# Patient Record
Sex: FEMALE | Race: WHITE | Employment: FULL TIME | URBAN - METROPOLITAN AREA
[De-identification: names, ages, dates, MRNs, and addresses within clinical notes are randomized per-mention and may not be internally consistent; named-entity substitution may affect disease eponyms.]

---

## 2017-01-24 ENCOUNTER — OFFICE VISIT (OUTPATIENT)
Dept: INTERNAL MEDICINE CLINIC | Facility: CLINIC | Age: 32
End: 2017-01-24

## 2017-01-24 VITALS
HEART RATE: 107 BPM | SYSTOLIC BLOOD PRESSURE: 108 MMHG | HEIGHT: 70 IN | TEMPERATURE: 98 F | DIASTOLIC BLOOD PRESSURE: 72 MMHG | OXYGEN SATURATION: 98 % | BODY MASS INDEX: 26.05 KG/M2 | WEIGHT: 182 LBS

## 2017-01-24 DIAGNOSIS — J40 BRONCHITIS: Primary | ICD-10-CM

## 2017-01-24 PROCEDURE — 99213 OFFICE O/P EST LOW 20 MIN: CPT | Performed by: INTERNAL MEDICINE

## 2017-01-24 PROCEDURE — 99212 OFFICE O/P EST SF 10 MIN: CPT | Performed by: INTERNAL MEDICINE

## 2017-01-24 RX ORDER — INHALER, ASSIST DEVICES
SPACER (EA) MISCELLANEOUS
Qty: 1 EACH | Refills: 0 | Status: SHIPPED | OUTPATIENT
Start: 2017-01-24 | End: 2019-07-30

## 2017-01-24 RX ORDER — ALBUTEROL SULFATE 90 UG/1
2 AEROSOL, METERED RESPIRATORY (INHALATION) EVERY 6 HOURS PRN
Qty: 1 INHALER | Refills: 0 | Status: SHIPPED | OUTPATIENT
Start: 2017-01-24 | End: 2017-07-19 | Stop reason: ALTCHOICE

## 2017-01-24 RX ORDER — PROMETHAZINE HYDROCHLORIDE AND CODEINE PHOSPHATE 6.25; 1 MG/5ML; MG/5ML
2.5 SYRUP ORAL EVERY 6 HOURS PRN
Qty: 240 ML | Refills: 0 | Status: SHIPPED | OUTPATIENT
Start: 2017-01-24 | End: 2017-07-19 | Stop reason: ALTCHOICE

## 2017-01-24 NOTE — PROGRESS NOTES
Gail Abdi is a 36year old female. Patient presents with:  Cough: Patient complains of deep cough, chest congestion, shortness of breath, fatigue, felt like she had a low grade fever yesterday.  She reports she had similar symptoms last month that laste kg/m2  SpO2 98%  LMP 01/16/2017 (Approximate)  GENERAL: well developed, well nourished, in no apparent distress  HEENT: normal oropharynx, normal TM's  NECK: supple, no adenopathy  LUNGS: clear to auscultation, no crackles, +slight bilateral exp wheezing a

## 2017-02-03 ENCOUNTER — TELEPHONE (OUTPATIENT)
Dept: INTERNAL MEDICINE CLINIC | Facility: CLINIC | Age: 32
End: 2017-02-03

## 2017-02-03 NOTE — TELEPHONE ENCOUNTER
Patient knows Dr. Chester Arguelles is out till Tuesday. Patient has updated information about Vyvance medication. Patient now has new [employer] - new plan does not cover the Vyvance. Patient might need to be prescribed a different medication.   Patient u

## 2017-02-04 NOTE — TELEPHONE ENCOUNTER
We could try Adderall XR 20mg/day -- there is no definite equivalent dose between Adderall and Vyvanse, so we may need to titrate up the dose if needed.   I will order if okay with pt -- would just do one month at a time for now until we are sure of her fin

## 2017-02-07 RX ORDER — DEXTROAMPHETAMINE SACCHARATE, AMPHETAMINE ASPARTATE MONOHYDRATE, DEXTROAMPHETAMINE SULFATE AND AMPHETAMINE SULFATE 5; 5; 5; 5 MG/1; MG/1; MG/1; MG/1
20 CAPSULE, EXTENDED RELEASE ORAL DAILY
Qty: 30 CAPSULE | Refills: 0 | Status: SHIPPED | OUTPATIENT
Start: 2017-02-07 | End: 2017-03-09

## 2017-03-08 ENCOUNTER — TELEPHONE (OUTPATIENT)
Dept: INTERNAL MEDICINE CLINIC | Facility: CLINIC | Age: 32
End: 2017-03-08

## 2017-03-08 RX ORDER — DEXTROAMPHETAMINE SACCHARATE, AMPHETAMINE ASPARTATE MONOHYDRATE, DEXTROAMPHETAMINE SULFATE AND AMPHETAMINE SULFATE 5; 5; 5; 5 MG/1; MG/1; MG/1; MG/1
20 CAPSULE, EXTENDED RELEASE ORAL DAILY
Qty: 30 CAPSULE | Refills: 0 | Status: SHIPPED | OUTPATIENT
Start: 2017-05-07 | End: 2017-06-02

## 2017-03-08 RX ORDER — DEXTROAMPHETAMINE SACCHARATE, AMPHETAMINE ASPARTATE MONOHYDRATE, DEXTROAMPHETAMINE SULFATE AND AMPHETAMINE SULFATE 5; 5; 5; 5 MG/1; MG/1; MG/1; MG/1
20 CAPSULE, EXTENDED RELEASE ORAL DAILY
Qty: 30 CAPSULE | Refills: 0 | Status: SHIPPED | OUTPATIENT
Start: 2017-03-08 | End: 2017-04-07

## 2017-03-08 RX ORDER — DEXTROAMPHETAMINE SACCHARATE, AMPHETAMINE ASPARTATE MONOHYDRATE, DEXTROAMPHETAMINE SULFATE AND AMPHETAMINE SULFATE 5; 5; 5; 5 MG/1; MG/1; MG/1; MG/1
20 CAPSULE, EXTENDED RELEASE ORAL DAILY
Qty: 30 CAPSULE | Refills: 0 | Status: SHIPPED | OUTPATIENT
Start: 2017-04-07 | End: 2017-05-07

## 2017-03-08 NOTE — TELEPHONE ENCOUNTER
I left message on patients cell phone (per HIPPA) that Rx's ready for pick-at  [address] and asked that patient call back to schedule annual PE with  in June.

## 2017-03-08 NOTE — TELEPHONE ENCOUNTER
I wrote 90 day supply of Adderall. Ready to . Pt will be due for annual PE in June -- please ask her to schedule.

## 2017-05-08 ENCOUNTER — TELEPHONE (OUTPATIENT)
Dept: INTERNAL MEDICINE CLINIC | Facility: CLINIC | Age: 32
End: 2017-05-08

## 2017-05-08 RX ORDER — ESCITALOPRAM OXALATE 10 MG/1
10 TABLET ORAL
Qty: 90 TABLET | Refills: 3 | Status: SHIPPED | OUTPATIENT
Start: 2017-05-08 | End: 2018-06-01

## 2017-05-08 NOTE — TELEPHONE ENCOUNTER
Pt. Is requesting a refill on: Lexapro  Pt. Is out of meds she switched pharmacies  Ph. # [phone]    [facility] ph.  # [phone]     Routed to Rx   Dr. Ulysses Leblanc is off can on-call fill

## 2017-06-02 ENCOUNTER — TELEPHONE (OUTPATIENT)
Dept: INTERNAL MEDICINE CLINIC | Facility: CLINIC | Age: 32
End: 2017-06-02

## 2017-06-02 RX ORDER — DEXTROAMPHETAMINE SACCHARATE, AMPHETAMINE ASPARTATE MONOHYDRATE, DEXTROAMPHETAMINE SULFATE AND AMPHETAMINE SULFATE 5; 5; 5; 5 MG/1; MG/1; MG/1; MG/1
20 CAPSULE, EXTENDED RELEASE ORAL DAILY
Qty: 30 CAPSULE | Refills: 0 | Status: SHIPPED | OUTPATIENT
Start: 2017-06-02 | End: 2017-07-02

## 2017-06-02 NOTE — TELEPHONE ENCOUNTER
Pt. Is requesting a refill on: Adderall  Ph. # [phone]  Pt. Has two pills left   Pt.  Is aware Dr. Ortega Johnson is out & would like on-call to prescribe   Routed to Rx

## 2017-06-02 NOTE — TELEPHONE ENCOUNTER
Called patient and relayed DR. AVILEZ message - verbalized understanding. RX in envelope at  [address] for  .  Patients mother will  RX

## 2017-07-06 ENCOUNTER — TELEPHONE (OUTPATIENT)
Dept: INTERNAL MEDICINE CLINIC | Facility: CLINIC | Age: 32
End: 2017-07-06

## 2017-07-06 RX ORDER — DEXTROAMPHETAMINE SACCHARATE, AMPHETAMINE ASPARTATE MONOHYDRATE, DEXTROAMPHETAMINE SULFATE AND AMPHETAMINE SULFATE 5; 5; 5; 5 MG/1; MG/1; MG/1; MG/1
20 CAPSULE, EXTENDED RELEASE ORAL DAILY
Qty: 30 CAPSULE | Refills: 0 | Status: SHIPPED | OUTPATIENT
Start: 2017-07-06 | End: 2017-07-19 | Stop reason: DRUGHIGH

## 2017-07-06 RX ORDER — DEXTROAMPHETAMINE SACCHARATE, AMPHETAMINE ASPARTATE MONOHYDRATE, DEXTROAMPHETAMINE SULFATE AND AMPHETAMINE SULFATE 5; 5; 5; 5 MG/1; MG/1; MG/1; MG/1
20 CAPSULE, EXTENDED RELEASE ORAL DAILY
Qty: 30 CAPSULE | Refills: 0 | Status: SHIPPED | OUTPATIENT
Start: 2017-08-05 | End: 2017-07-19

## 2017-07-06 RX ORDER — DEXTROAMPHETAMINE SACCHARATE, AMPHETAMINE ASPARTATE MONOHYDRATE, DEXTROAMPHETAMINE SULFATE AND AMPHETAMINE SULFATE 5; 5; 5; 5 MG/1; MG/1; MG/1; MG/1
20 CAPSULE, EXTENDED RELEASE ORAL DAILY
Qty: 30 CAPSULE | Refills: 0 | Status: SHIPPED | OUTPATIENT
Start: 2017-09-04 | End: 2017-07-19

## 2017-07-06 NOTE — TELEPHONE ENCOUNTER
Pt requesting refill Aderall 20 mg, pt only has 2 pills left.   Please call when ready for  [phone]    Tasked to rx low

## 2017-07-06 NOTE — TELEPHONE ENCOUNTER
Spoke with patient and relayed Dr. Devika Moseley' message. Patient verbalized an understanding and is scheduled for a PE on 7/19. Rx left at [address]  in the blue file folder. Patient will p/u today.

## 2017-07-06 NOTE — TELEPHONE ENCOUNTER
Rx'es ready to  (90 day). Please remind pt that she is overdue for a physical.  Needs to schedule before we give any additional refills.

## 2017-07-19 ENCOUNTER — OFFICE VISIT (OUTPATIENT)
Dept: INTERNAL MEDICINE CLINIC | Facility: CLINIC | Age: 32
End: 2017-07-19

## 2017-07-19 VITALS
SYSTOLIC BLOOD PRESSURE: 122 MMHG | WEIGHT: 191 LBS | TEMPERATURE: 99 F | HEART RATE: 88 BPM | BODY MASS INDEX: 27.35 KG/M2 | OXYGEN SATURATION: 98 % | HEIGHT: 70 IN | DIASTOLIC BLOOD PRESSURE: 70 MMHG

## 2017-07-19 DIAGNOSIS — F98.8 ATTENTION DEFICIT DISORDER (ADD) WITHOUT HYPERACTIVITY: ICD-10-CM

## 2017-07-19 DIAGNOSIS — Z00.00 ROUTINE HEALTH MAINTENANCE: Primary | ICD-10-CM

## 2017-07-19 PROCEDURE — 99395 PREV VISIT EST AGE 18-39: CPT | Performed by: INTERNAL MEDICINE

## 2017-07-19 RX ORDER — DEXTROAMPHETAMINE SACCHARATE, AMPHETAMINE ASPARTATE MONOHYDRATE, DEXTROAMPHETAMINE SULFATE AND AMPHETAMINE SULFATE 6.25; 6.25; 6.25; 6.25 MG/1; MG/1; MG/1; MG/1
25 CAPSULE, EXTENDED RELEASE ORAL DAILY
Qty: 30 CAPSULE | Refills: 0 | Status: SHIPPED | OUTPATIENT
Start: 2017-07-19 | End: 2017-08-18

## 2017-07-19 RX ORDER — GARLIC EXTRACT 500 MG
1 CAPSULE ORAL DAILY
COMMUNITY

## 2017-07-19 NOTE — PROGRESS NOTES
Shahana Cole is a 29year old female. Patient presents with:  Physical: annual      HPI:   Shahana Cole is a 29year old female who presents for a complete physical exam.   Feels well. Asking about increasing her Adderall. Feels a lack of focus.   Does Padmaja Tobin Mother      Parkinson's   • Blood Disorder Sister    • Psoriasis Sister    • Diabetes Other    • Breast Cancer Other       Social History:   Smoking status: Never Smoker                                                              [place]

## 2017-07-20 ENCOUNTER — LAB REQUISITION (OUTPATIENT)
Dept: LAB | Facility: HOSPITAL | Age: 32
End: 2017-07-20
Payer: COMMERCIAL

## 2017-07-20 DIAGNOSIS — Z11.3 ENCOUNTER FOR SCREENING FOR INFECTIONS WITH PREDOMINANTLY SEXUAL MODE OF TRANSMISSION: ICD-10-CM

## 2017-07-20 PROCEDURE — 87591 N.GONORRHOEAE DNA AMP PROB: CPT | Performed by: OBSTETRICS & GYNECOLOGY

## 2017-07-20 PROCEDURE — 87491 CHLMYD TRACH DNA AMP PROBE: CPT | Performed by: OBSTETRICS & GYNECOLOGY

## 2017-07-21 LAB
C TRACH DNA SPEC QL NAA+PROBE: NEGATIVE
N GONORRHOEA DNA SPEC QL NAA+PROBE: NEGATIVE

## 2017-09-27 ENCOUNTER — TELEPHONE (OUTPATIENT)
Dept: INTERNAL MEDICINE CLINIC | Facility: CLINIC | Age: 32
End: 2017-09-27

## 2017-09-27 RX ORDER — DEXTROAMPHETAMINE SACCHARATE, AMPHETAMINE ASPARTATE MONOHYDRATE, DEXTROAMPHETAMINE SULFATE AND AMPHETAMINE SULFATE 6.25; 6.25; 6.25; 6.25 MG/1; MG/1; MG/1; MG/1
25 CAPSULE, EXTENDED RELEASE ORAL DAILY
Qty: 30 CAPSULE | Refills: 0 | Status: SHIPPED | OUTPATIENT
Start: 2017-10-27 | End: 2017-11-26

## 2017-09-27 RX ORDER — DEXTROAMPHETAMINE SACCHARATE, AMPHETAMINE ASPARTATE MONOHYDRATE, DEXTROAMPHETAMINE SULFATE AND AMPHETAMINE SULFATE 6.25; 6.25; 6.25; 6.25 MG/1; MG/1; MG/1; MG/1
25 CAPSULE, EXTENDED RELEASE ORAL DAILY
Qty: 30 CAPSULE | Refills: 0 | Status: SHIPPED | OUTPATIENT
Start: 2017-11-26 | End: 2017-12-26

## 2017-09-27 RX ORDER — DEXTROAMPHETAMINE SACCHARATE, AMPHETAMINE ASPARTATE MONOHYDRATE, DEXTROAMPHETAMINE SULFATE AND AMPHETAMINE SULFATE 6.25; 6.25; 6.25; 6.25 MG/1; MG/1; MG/1; MG/1
25 CAPSULE, EXTENDED RELEASE ORAL DAILY
Qty: 30 CAPSULE | Refills: 0 | Status: SHIPPED | OUTPATIENT
Start: 2017-09-27 | End: 2017-10-27

## 2017-09-27 NOTE — TELEPHONE ENCOUNTER
Called patient and relayed DR. DOMINGUEZ message that RX is ready for  at  [address]- left on VM per hipaa Nadya Awkward will put RX to )

## 2017-09-27 NOTE — TELEPHONE ENCOUNTER
To MD:  The above refill request is for a controlled substance. Please indicate yes or no to refill 30 days supply plus one refill. If more refills are appropriate, please indicate quantity  To DR. Ortega Johnson

## 2018-01-18 ENCOUNTER — OFFICE VISIT (OUTPATIENT)
Dept: INTERNAL MEDICINE CLINIC | Facility: CLINIC | Age: 33
End: 2018-01-18

## 2018-01-18 VITALS
TEMPERATURE: 98 F | WEIGHT: 196 LBS | DIASTOLIC BLOOD PRESSURE: 78 MMHG | SYSTOLIC BLOOD PRESSURE: 132 MMHG | HEART RATE: 110 BPM | BODY MASS INDEX: 28.06 KG/M2 | OXYGEN SATURATION: 98 % | HEIGHT: 70 IN | RESPIRATION RATE: 16 BRPM

## 2018-01-18 DIAGNOSIS — F98.8 ATTENTION DEFICIT DISORDER (ADD) WITHOUT HYPERACTIVITY: ICD-10-CM

## 2018-01-18 DIAGNOSIS — J06.9 UPPER RESPIRATORY TRACT INFECTION, UNSPECIFIED TYPE: Primary | ICD-10-CM

## 2018-01-18 PROCEDURE — 99212 OFFICE O/P EST SF 10 MIN: CPT | Performed by: INTERNAL MEDICINE

## 2018-01-18 PROCEDURE — 99214 OFFICE O/P EST MOD 30 MIN: CPT | Performed by: INTERNAL MEDICINE

## 2018-01-18 RX ORDER — AMOXICILLIN 875 MG/1
875 TABLET, COATED ORAL 2 TIMES DAILY
Qty: 20 TABLET | Refills: 0 | Status: SHIPPED | OUTPATIENT
Start: 2018-01-18 | End: 2018-06-02

## 2018-01-18 RX ORDER — DEXTROAMPHETAMINE SACCHARATE, AMPHETAMINE ASPARTATE MONOHYDRATE, DEXTROAMPHETAMINE SULFATE AND AMPHETAMINE SULFATE 6.25; 6.25; 6.25; 6.25 MG/1; MG/1; MG/1; MG/1
25 CAPSULE, EXTENDED RELEASE ORAL DAILY
Qty: 30 CAPSULE | Refills: 0 | Status: SHIPPED | OUTPATIENT
Start: 2018-01-18 | End: 2018-06-02

## 2018-01-18 RX ORDER — DEXTROAMPHETAMINE SACCHARATE, AMPHETAMINE ASPARTATE MONOHYDRATE, DEXTROAMPHETAMINE SULFATE AND AMPHETAMINE SULFATE 6.25; 6.25; 6.25; 6.25 MG/1; MG/1; MG/1; MG/1
25 CAPSULE, EXTENDED RELEASE ORAL EVERY MORNING
COMMUNITY
End: 2018-05-16

## 2018-01-18 RX ORDER — DEXTROAMPHETAMINE SACCHARATE, AMPHETAMINE ASPARTATE MONOHYDRATE, DEXTROAMPHETAMINE SULFATE AND AMPHETAMINE SULFATE 6.25; 6.25; 6.25; 6.25 MG/1; MG/1; MG/1; MG/1
25 CAPSULE, EXTENDED RELEASE ORAL DAILY
Qty: 30 CAPSULE | Refills: 0 | Status: SHIPPED | OUTPATIENT
Start: 2018-02-17 | End: 2018-06-02

## 2018-01-18 RX ORDER — DEXTROAMPHETAMINE SACCHARATE, AMPHETAMINE ASPARTATE MONOHYDRATE, DEXTROAMPHETAMINE SULFATE AND AMPHETAMINE SULFATE 6.25; 6.25; 6.25; 6.25 MG/1; MG/1; MG/1; MG/1
25 CAPSULE, EXTENDED RELEASE ORAL DAILY
Qty: 30 CAPSULE | Refills: 0 | Status: SHIPPED | OUTPATIENT
Start: 2018-03-19 | End: 2018-06-02

## 2018-01-18 RX ORDER — DEXTROAMPHETAMINE SACCHARATE, AMPHETAMINE ASPARTATE MONOHYDRATE, DEXTROAMPHETAMINE SULFATE AND AMPHETAMINE SULFATE 6.25; 6.25; 6.25; 6.25 MG/1; MG/1; MG/1; MG/1
25 CAPSULE, EXTENDED RELEASE ORAL DAILY
Qty: 30 CAPSULE | Refills: 0 | Status: CANCELLED | OUTPATIENT
Start: 2018-01-18 | End: 2018-02-17

## 2018-01-18 NOTE — PROGRESS NOTES
Melany Pearl is a 29year old female. Patient presents with:  Sinus Problem: Patient c/o sinus problems for past 2 days. Pt c/o bilateral eye/ear pain, stiff neck, sore throat, temp 99.3 yesterday, and post nasal drip. Pain 3/10 to eyes, ears, and throat. (81.6 kg)  06/14/16 : 183 lb (83 kg)    Body mass index is 28.12 kg/m².       EXAM:   /78 (BP Location: Right arm, Patient Position: Sitting, Cuff Size: adult)   Pulse 110   Temp 98.3 °F (36.8 °C) (Oral)   Resp 16   Ht 5' 10\" (1.778 m)   Wt 196 lb (8

## 2018-01-21 ENCOUNTER — TELEPHONE (OUTPATIENT)
Dept: INTERNAL MEDICINE CLINIC | Facility: CLINIC | Age: 33
End: 2018-01-21

## 2018-05-16 NOTE — TELEPHONE ENCOUNTER
Pt. Called for her written scripts for Adderal.  She usually gets 3 separate scripts for the next 3 months. Please call her at  [phone] when ready for . She only has 1 day left and is hoping to  the scripts tomorrow.

## 2018-05-16 NOTE — TELEPHONE ENCOUNTER
To MD:  The above refill request is for a controlled substance. Please indicate yes or no to refill 30 days supply plus one refill. If more refills are appropriate, please indicate quantity  To DR. Justin Ramirez

## 2018-05-18 RX ORDER — DEXTROAMPHETAMINE SACCHARATE, AMPHETAMINE ASPARTATE MONOHYDRATE, DEXTROAMPHETAMINE SULFATE AND AMPHETAMINE SULFATE 6.25; 6.25; 6.25; 6.25 MG/1; MG/1; MG/1; MG/1
25 CAPSULE, EXTENDED RELEASE ORAL EVERY MORNING
Qty: 30 CAPSULE | Refills: 0 | Status: SHIPPED | OUTPATIENT
Start: 2018-05-18 | End: 2018-07-18

## 2018-05-18 NOTE — TELEPHONE ENCOUNTER
Pt. Called following up on refill request for Adderal.  Per Carolyn Kenney request needs to go to the On call which is Dr. Josephine Donaldson. Today. Please keenan Ravinder Fernandes at [phone] when ready for .

## 2018-05-18 NOTE — TELEPHONE ENCOUNTER
Noted.  I reviewed the patient's chart. I reviewed the patient's medication with a 30 day supply. I have printed out a prescription for the patient which I have signed. I placed a prescription in an envelope on my nurse's desk with the patient's name.

## 2018-05-18 NOTE — TELEPHONE ENCOUNTER
Patient is requesting a 3 month refill (3 separate scripts)  for her Adderall. Dr. Capone Portal refilled a 30 day supply for today.      To Dr. Franklin Shah to please advise on remaining refill request.

## 2018-05-31 RX ORDER — DEXTROAMPHETAMINE SACCHARATE, AMPHETAMINE ASPARTATE MONOHYDRATE, DEXTROAMPHETAMINE SULFATE AND AMPHETAMINE SULFATE 6.25; 6.25; 6.25; 6.25 MG/1; MG/1; MG/1; MG/1
25 CAPSULE, EXTENDED RELEASE ORAL DAILY
Qty: 30 CAPSULE | Refills: 0 | Status: SHIPPED | OUTPATIENT
Start: 2018-05-31 | End: 2018-07-18

## 2018-05-31 RX ORDER — DEXTROAMPHETAMINE SACCHARATE, AMPHETAMINE ASPARTATE MONOHYDRATE, DEXTROAMPHETAMINE SULFATE AND AMPHETAMINE SULFATE 6.25; 6.25; 6.25; 6.25 MG/1; MG/1; MG/1; MG/1
25 CAPSULE, EXTENDED RELEASE ORAL DAILY
Qty: 30 CAPSULE | Refills: 0 | Status: SHIPPED | OUTPATIENT
Start: 2018-07-30 | End: 2018-07-18

## 2018-05-31 RX ORDER — DEXTROAMPHETAMINE SACCHARATE, AMPHETAMINE ASPARTATE MONOHYDRATE, DEXTROAMPHETAMINE SULFATE AND AMPHETAMINE SULFATE 6.25; 6.25; 6.25; 6.25 MG/1; MG/1; MG/1; MG/1
25 CAPSULE, EXTENDED RELEASE ORAL DAILY
Qty: 30 CAPSULE | Refills: 0 | Status: SHIPPED | OUTPATIENT
Start: 2018-06-30 | End: 2018-07-18

## 2018-06-02 RX ORDER — ESCITALOPRAM OXALATE 10 MG/1
TABLET ORAL
Qty: 90 TABLET | Refills: 0 | Status: SHIPPED | OUTPATIENT
Start: 2018-06-02 | End: 2018-09-12

## 2018-06-22 NOTE — TELEPHONE ENCOUNTER
To MD:  The above refill request is for a controlled substance. Please indicate yes or no to refill 30 days supply plus one refill. If more refills are appropriate, please indicate quantity  To DR. Otilio Zapata

## 2018-06-22 NOTE — TELEPHONE ENCOUNTER
Pt would like a refill for her Amphetamine-Dextroamphet ER (ADDERALL XR) 25 MG Oral Capsule. Pt only has a couple pills, states she doesn't take them over the weekend so she will be fine until next week. Best call back for patient is [phone].  Tasked t

## 2018-06-25 NOTE — TELEPHONE ENCOUNTER
Pt. Is calling back to check status of Rx pt. Is out of meds  Ph. # [phone]  Pt.  Is aware  Is off & would like on-call to address   Routed to Rx

## 2018-06-27 RX ORDER — DEXTROAMPHETAMINE SACCHARATE, AMPHETAMINE ASPARTATE MONOHYDRATE, DEXTROAMPHETAMINE SULFATE AND AMPHETAMINE SULFATE 6.25; 6.25; 6.25; 6.25 MG/1; MG/1; MG/1; MG/1
25 CAPSULE, EXTENDED RELEASE ORAL DAILY
Qty: 30 CAPSULE | Refills: 0 | Status: CANCELLED | OUTPATIENT
Start: 2018-06-27 | End: 2018-07-27

## 2018-06-27 NOTE — TELEPHONE ENCOUNTER
Pt. Is calling to check status of Rx pt.  Has been ut of meds for two days  Ph. # [phone]   Routed low to Rx

## 2018-07-18 ENCOUNTER — OFFICE VISIT (OUTPATIENT)
Dept: INTERNAL MEDICINE CLINIC | Facility: CLINIC | Age: 33
End: 2018-07-18
Payer: COMMERCIAL

## 2018-07-18 ENCOUNTER — LAB ENCOUNTER (OUTPATIENT)
Dept: LAB | Age: 33
End: 2018-07-18
Attending: INTERNAL MEDICINE
Payer: COMMERCIAL

## 2018-07-18 VITALS
HEIGHT: 70 IN | HEART RATE: 81 BPM | BODY MASS INDEX: 30.06 KG/M2 | TEMPERATURE: 98 F | DIASTOLIC BLOOD PRESSURE: 68 MMHG | WEIGHT: 210 LBS | SYSTOLIC BLOOD PRESSURE: 102 MMHG | OXYGEN SATURATION: 98 %

## 2018-07-18 DIAGNOSIS — R63.5 WEIGHT GAIN: ICD-10-CM

## 2018-07-18 DIAGNOSIS — Z00.00 ROUTINE HEALTH MAINTENANCE: ICD-10-CM

## 2018-07-18 DIAGNOSIS — R53.83 OTHER FATIGUE: ICD-10-CM

## 2018-07-18 DIAGNOSIS — R53.83 OTHER FATIGUE: Primary | ICD-10-CM

## 2018-07-18 LAB
ALBUMIN SERPL BCP-MCNC: 3.9 G/DL (ref 3.5–4.8)
ALBUMIN/GLOB SERPL: 1.3 {RATIO} (ref 1–2)
ALP SERPL-CCNC: 50 U/L (ref 32–100)
ALT SERPL-CCNC: 20 U/L (ref 14–54)
ANION GAP SERPL CALC-SCNC: 6 MMOL/L (ref 0–18)
AST SERPL-CCNC: 20 U/L (ref 15–41)
BASOPHILS # BLD: 0.1 K/UL (ref 0–0.2)
BASOPHILS NFR BLD: 1 %
BILIRUB SERPL-MCNC: 0.4 MG/DL (ref 0.3–1.2)
BUN SERPL-MCNC: 10 MG/DL (ref 8–20)
BUN/CREAT SERPL: 11.6 (ref 10–20)
CALCIUM SERPL-MCNC: 9.5 MG/DL (ref 8.5–10.5)
CHLORIDE SERPL-SCNC: 111 MMOL/L (ref 95–110)
CHOLEST SERPL-MCNC: 137 MG/DL (ref 110–200)
CO2 SERPL-SCNC: 24 MMOL/L (ref 22–32)
CREAT SERPL-MCNC: 0.86 MG/DL (ref 0.5–1.5)
EOSINOPHIL # BLD: 0.2 K/UL (ref 0–0.7)
EOSINOPHIL NFR BLD: 3 %
ERYTHROCYTE [DISTWIDTH] IN BLOOD BY AUTOMATED COUNT: 12.7 % (ref 11–15)
GLOBULIN PLAS-MCNC: 3.1 G/DL (ref 2.5–3.7)
GLUCOSE SERPL-MCNC: 89 MG/DL (ref 70–99)
HCT VFR BLD AUTO: 42.5 % (ref 35–48)
HDLC SERPL-MCNC: 58 MG/DL
HGB BLD-MCNC: 14.6 G/DL (ref 12–16)
LDLC SERPL CALC-MCNC: 59 MG/DL (ref 0–99)
LYMPHOCYTES # BLD: 2 K/UL (ref 1–4)
LYMPHOCYTES NFR BLD: 29 %
MCH RBC QN AUTO: 30.7 PG (ref 27–32)
MCHC RBC AUTO-ENTMCNC: 34.4 G/DL (ref 32–37)
MCV RBC AUTO: 89.1 FL (ref 80–100)
MONOCYTES # BLD: 0.6 K/UL (ref 0–1)
MONOCYTES NFR BLD: 9 %
NEUTROPHILS # BLD AUTO: 4 K/UL (ref 1.8–7.7)
NEUTROPHILS NFR BLD: 58 %
NONHDLC SERPL-MCNC: 79 MG/DL
OSMOLALITY UR CALC.SUM OF ELEC: 291 MOSM/KG (ref 275–295)
PATIENT FASTING: YES
PLATELET # BLD AUTO: 334 K/UL (ref 140–400)
PMV BLD AUTO: 8.9 FL (ref 7.4–10.3)
POTASSIUM SERPL-SCNC: 4.3 MMOL/L (ref 3.3–5.1)
PROT SERPL-MCNC: 7 G/DL (ref 5.9–8.4)
RBC # BLD AUTO: 4.77 M/UL (ref 3.7–5.4)
SODIUM SERPL-SCNC: 141 MMOL/L (ref 136–144)
TRIGL SERPL-MCNC: 101 MG/DL (ref 1–149)
TSH SERPL-ACNC: 2.03 UIU/ML (ref 0.45–5.33)
WBC # BLD AUTO: 6.8 K/UL (ref 4–11)

## 2018-07-18 PROCEDURE — 85025 COMPLETE CBC W/AUTO DIFF WBC: CPT

## 2018-07-18 PROCEDURE — 84443 ASSAY THYROID STIM HORMONE: CPT | Performed by: INTERNAL MEDICINE

## 2018-07-18 PROCEDURE — 80061 LIPID PANEL: CPT

## 2018-07-18 PROCEDURE — 82306 VITAMIN D 25 HYDROXY: CPT

## 2018-07-18 PROCEDURE — 99212 OFFICE O/P EST SF 10 MIN: CPT | Performed by: INTERNAL MEDICINE

## 2018-07-18 PROCEDURE — 99214 OFFICE O/P EST MOD 30 MIN: CPT | Performed by: INTERNAL MEDICINE

## 2018-07-18 PROCEDURE — 80053 COMPREHEN METABOLIC PANEL: CPT

## 2018-07-18 PROCEDURE — 36415 COLL VENOUS BLD VENIPUNCTURE: CPT

## 2018-07-18 NOTE — PROGRESS NOTES
Melany Pearl is a 34year old female. Patient presents with:  Checkup: Fatigue, decrease in sex drive, anxiety, & weight gain. Would like to discuss getting hormones checked.    Joint Pain: WILLIAM Knees & Groin Area    HPI:     Pt states she cannot stop gaini capsule Rfl: 0   Ascorbic Acid (VITAMIN C OR) Take by mouth daily. Disp:  Rfl:    Acidophilus/Pectin Oral Cap Take 1 capsule by mouth daily.  Disp:  Rfl:    Spacer/Aero-Holding Chambers (AEROCHAMBER MV) Does not apply Misc For use with albuterol Disp: 1 eac options, increasing activity. Check TSH, CBC, CMP. Consider w/u for RASHEED although no other sx besides fatigue.      2. ADD  Pt felt vyvanse was more effective; now has new insurance so vyvanse covered -- will change back to vyvanse, which may also help wit

## 2018-07-20 LAB — 25(OH)D3 SERPL-MCNC: 41.8 NG/ML

## 2018-07-24 ENCOUNTER — OFFICE VISIT (OUTPATIENT)
Dept: INTERNAL MEDICINE CLINIC | Facility: CLINIC | Age: 33
End: 2018-07-24
Payer: COMMERCIAL

## 2018-07-24 VITALS
BODY MASS INDEX: 30.49 KG/M2 | HEART RATE: 78 BPM | DIASTOLIC BLOOD PRESSURE: 80 MMHG | TEMPERATURE: 98 F | WEIGHT: 213 LBS | HEIGHT: 70 IN | SYSTOLIC BLOOD PRESSURE: 120 MMHG | RESPIRATION RATE: 16 BRPM | OXYGEN SATURATION: 98 %

## 2018-07-24 DIAGNOSIS — F41.9 ANXIETY: ICD-10-CM

## 2018-07-24 DIAGNOSIS — R53.83 OTHER FATIGUE: ICD-10-CM

## 2018-07-24 DIAGNOSIS — Z00.00 ROUTINE HEALTH MAINTENANCE: Primary | ICD-10-CM

## 2018-07-24 DIAGNOSIS — F98.8 ATTENTION DEFICIT DISORDER (ADD) WITHOUT HYPERACTIVITY: ICD-10-CM

## 2018-07-24 DIAGNOSIS — H91.93 BILATERAL HEARING LOSS, UNSPECIFIED HEARING LOSS TYPE: ICD-10-CM

## 2018-07-24 PROCEDURE — 99395 PREV VISIT EST AGE 18-39: CPT | Performed by: INTERNAL MEDICINE

## 2018-07-24 NOTE — PROGRESS NOTES
Narinder Brewster is a 34year old female. Patient presents with: Annual: Pt presents for her annual PE. Denies any new complaints at this time; seen last week. Sees gyne for paps; appt with gyne next week. Hearing Problem: Feels like hearing is \"dull\" . VITAMINS/WOMENS) Oral Tab Take 1 tablet by mouth. Disp:  Rfl: 0   [START ON 8/17/2018] Lisdexamfetamine Dimesylate (VYVANSE) 50 MG Oral Cap Take 1 capsule (50 mg total) by mouth daily.  Disp: 30 capsule Rfl: 0   [START ON 9/16/2018] Lisdexamfetamine Dimesyl soft, NT, ND, NABS, no HSM  EXTREMITIES: no cyanosis, clubbing or edema, +2 DP pulses        ASSESSMENT AND PLAN:     1. Routine health maintenance  Paps per Dr. Romina Caruso. Lipids, FBS normal.     2.  Attention deficit disorder  Changed Adderall XR back

## 2018-07-24 NOTE — TELEPHONE ENCOUNTER
After reviewing 7/18/18 note, Dr. Rach Aguero and pt decided to switch pt from Adderal to Vyvanse.   This message was left on [employer] VM

## 2018-07-24 NOTE — TELEPHONE ENCOUNTER
Lily is calling questioning the prescription, Lisdexamfetamine Dimesylate (VYVANSE) 50 MG Oral Cap. Pt picked up her ADDERALL on June 29th and should still have medication left.  Pharmacy wanted to know if Dr Boris Valdez knows this and if its okay to have medica

## 2018-08-27 ENCOUNTER — TELEPHONE (OUTPATIENT)
Dept: INTERNAL MEDICINE CLINIC | Facility: CLINIC | Age: 33
End: 2018-08-27

## 2018-08-27 NOTE — TELEPHONE ENCOUNTER
Pt lost her Vyvanse 50mg rx pt was moving and now she cant find rx.  Pt is completely out please advise

## 2018-08-27 NOTE — TELEPHONE ENCOUNTER
Called patient and relayed DR. VEGA message   Verbalized understanding .  Will pick  Up in [address]

## 2018-08-27 NOTE — TELEPHONE ENCOUNTER
Refilled Vyvanse 50 mg po qD, #30, 2RF;  Rx left at window for pick-up; (remind her Rx in [address]) please call pt

## 2018-09-13 RX ORDER — ESCITALOPRAM OXALATE 10 MG/1
TABLET ORAL
Qty: 90 TABLET | Refills: 1 | Status: SHIPPED | OUTPATIENT
Start: 2018-09-13 | End: 2019-03-11

## 2018-10-18 ENCOUNTER — TELEPHONE (OUTPATIENT)
Dept: INTERNAL MEDICINE CLINIC | Facility: CLINIC | Age: 33
End: 2018-10-18

## 2018-10-18 NOTE — TELEPHONE ENCOUNTER
To Dr. Devika Moseley - see below. Had chills last night, temp was 95.5 yesterday with sore throat and mouth sores/pustules. This AM noticed sore on hands. Pt returned 2 days ago from [address] [place] [address].

## 2018-10-18 NOTE — TELEPHONE ENCOUNTER
Please call pt, believes she might have hand/foot and mouth. Pt just returned from trip two days ago. Has sores in mouth and on her hands. Please call to advise  Is pt contagious?   Tasked to nursing

## 2018-10-18 NOTE — TELEPHONE ENCOUNTER
Spoke with Jarred Cuellar. Sx sound like hand/foot/mouth. Discussed supportive care -- tylenol, ibuprofen, push fluids.

## 2018-10-22 NOTE — TELEPHONE ENCOUNTER
Pt. Called stating the blisters in her mouth are causing her tongue to split open and it is very painful. There is also white all over her mouth. She states she can't eat or drink anything other than water due to the pain.    She is asking if there is any yes

## 2018-10-22 NOTE — TELEPHONE ENCOUNTER
Under the assumption that she is not pregnant, I would recommend trying cepacol lozenges to help with pain. She should be able to alternate between ibuprofen 600mg and tylenol 500mg every 6 hours also to help with the pain.  The pain should improve over the

## 2018-10-22 NOTE — TELEPHONE ENCOUNTER
Called and relayed Dr. Kassy Truong recommendations. She has tried Cepacol and it causes burning. She will try alternating tylenol and ibuprofen. If not better she will call to schedule a visit with pcp.

## 2018-10-24 ENCOUNTER — TELEPHONE (OUTPATIENT)
Dept: INTERNAL MEDICINE CLINIC | Facility: CLINIC | Age: 33
End: 2018-10-24

## 2018-10-24 NOTE — TELEPHONE ENCOUNTER
Pt's mother Lázaro Kennedy called to see what can be done. Her daughter is really in a lot of pain. She can not eat anything and has not eaten in a week. She can barely talk. Mom is really concerned.

## 2018-10-24 NOTE — TELEPHONE ENCOUNTER
Pt reported \"the hand and foot sores are pretty much gone but my mouth is still all cracked and icky\". Denied development of any new symptoms. Per instructions last week she is supposed to f/u with PCP this week for ongoing symptoms.  No appts available w

## 2018-10-24 NOTE — TELEPHONE ENCOUNTER
Pt is still having the same symptoms she had a week ago her hands and feet are better but her mouth is not pt cant eat, not even apple souse now she is coughing up green phlegm also.

## 2018-10-25 ENCOUNTER — OFFICE VISIT (OUTPATIENT)
Dept: INTERNAL MEDICINE CLINIC | Facility: CLINIC | Age: 33
End: 2018-10-25
Payer: COMMERCIAL

## 2018-10-25 VITALS
HEART RATE: 103 BPM | DIASTOLIC BLOOD PRESSURE: 94 MMHG | TEMPERATURE: 99 F | BODY MASS INDEX: 29.2 KG/M2 | WEIGHT: 204 LBS | OXYGEN SATURATION: 98 % | HEIGHT: 70 IN | SYSTOLIC BLOOD PRESSURE: 128 MMHG

## 2018-10-25 DIAGNOSIS — J20.9 ACUTE BRONCHITIS, UNSPECIFIED ORGANISM: ICD-10-CM

## 2018-10-25 DIAGNOSIS — K14.0 GLOSSITIS: ICD-10-CM

## 2018-10-25 DIAGNOSIS — B08.4 HAND, FOOT AND MOUTH DISEASE: Primary | ICD-10-CM

## 2018-10-25 PROCEDURE — 99212 OFFICE O/P EST SF 10 MIN: CPT | Performed by: INTERNAL MEDICINE

## 2018-10-25 PROCEDURE — 99214 OFFICE O/P EST MOD 30 MIN: CPT | Performed by: INTERNAL MEDICINE

## 2018-10-25 RX ORDER — AZITHROMYCIN 250 MG/1
TABLET, FILM COATED ORAL
Qty: 6 TABLET | Refills: 0 | Status: SHIPPED | OUTPATIENT
Start: 2018-10-25 | End: 2019-07-30 | Stop reason: ALTCHOICE

## 2018-10-25 NOTE — PROGRESS NOTES
Cruz Escoto is a 34year old female. HPI:   Patient presents with:  Hand, Foot, Mouth Disease: Since last Wednesday she has had hand foot and mouth. She spoke with Dr. Stephan Rodas about it last week. Mouth pain is the worst. Hands and feet are improved. (VYVANSE) 50 MG Oral Cap Take 1 capsule (50 mg total) by mouth daily. Disp: 30 capsule Rfl: 0   [START ON 10/26/2018] Lisdexamfetamine Dimesylate (VYVANSE) 50 MG Oral Cap Take 1 capsule (50 mg total) by mouth daily.  Disp: 30 capsule Rfl: 0   Ascorbic Acid and viscous lidocaine 2% ATAA q3hrs prn    Acute bronchitis  Zithromax (Z-pushpa) as directed x5d    Candida glossitis  Nystatin 100,000 U/mL 5 mL po QID x14d          Orders This Visit:  No orders of the defined types were placed in this encounter.       Meds

## 2018-11-29 ENCOUNTER — TELEPHONE (OUTPATIENT)
Dept: INTERNAL MEDICINE CLINIC | Facility: CLINIC | Age: 33
End: 2018-11-29

## 2018-11-29 RX ORDER — FLUCONAZOLE 100 MG/1
TABLET ORAL
Qty: 11 TABLET | Refills: 0 | Status: SHIPPED | OUTPATIENT
Start: 2018-11-29 | End: 2019-07-30 | Stop reason: ALTCHOICE

## 2018-11-29 NOTE — TELEPHONE ENCOUNTER
To Dr. Constantino Rapp - see below - slight improvement with treatment - small clear spot on tongue, the rest of tongue is still covered.

## 2018-11-29 NOTE — TELEPHONE ENCOUNTER
Pt still have Justo Garcia 85 she is done with the mouth wash asking what's next does she need to comeback in.  Please advise

## 2018-12-12 ENCOUNTER — TELEPHONE (OUTPATIENT)
Dept: INTERNAL MEDICINE CLINIC | Facility: CLINIC | Age: 33
End: 2018-12-12

## 2018-12-12 NOTE — TELEPHONE ENCOUNTER
To MD:  The above refill request is for a controlled substance. Please review pended medication order. Print and sign for staff to fax to pharmacy. To DR. Kirstie Powell

## 2018-12-12 NOTE — TELEPHONE ENCOUNTER
Pt. Is requesting a refill for Vyvanse pt.  Is out of meds on Friday  Ph. # [phone]  Routed to Rx

## 2019-03-05 ENCOUNTER — TELEPHONE (OUTPATIENT)
Dept: INTERNAL MEDICINE CLINIC | Facility: CLINIC | Age: 34
End: 2019-03-05

## 2019-03-05 NOTE — TELEPHONE ENCOUNTER
To Dr. Otilio Zapata-- message below copied from [address] message on 3/5/19-- please advise on symptoms. Spoke with patient to gain more information regarding her chest pain.  Patient states she has had it before-- it almost feels like indigestion and has been happen

## 2019-03-06 NOTE — TELEPHONE ENCOUNTER
I doubt related to her ovarian cysts but not my area of expertise. Rec discussed with gyne.   If he does not feel related, then I'm happy to see her in the office to discuss

## 2019-03-12 RX ORDER — ESCITALOPRAM OXALATE 10 MG/1
TABLET ORAL
Qty: 90 TABLET | Refills: 3 | Status: SHIPPED | OUTPATIENT
Start: 2019-03-12 | End: 2020-03-13

## 2019-03-20 NOTE — TELEPHONE ENCOUNTER
TO Dr. Astudillo Manual:   The pended medication is for a schedule CII medication;   Please review   Print and sign if appropriate and staff will contact the patient for .

## 2019-03-21 NOTE — TELEPHONE ENCOUNTER
Spoke to patient and notified her that Rx is ready for  at . Per patient, her mother, Jerome Wick, may be picking up the Rx and that is okay with her. Rx placed in envelope at  for .

## 2019-04-25 ENCOUNTER — TELEPHONE (OUTPATIENT)
Dept: INTERNAL MEDICINE CLINIC | Facility: CLINIC | Age: 34
End: 2019-04-25

## 2019-04-25 NOTE — TELEPHONE ENCOUNTER
Pt is calling request a refill on Lisdexamfetamine Dimesylate (VYVANSE) 50 MG Oral Cap. Pt states the Dr DOMINGUEZ always gives her three month supply. Last month she only got one month supply.      Best call back: [phone]

## 2019-06-17 ENCOUNTER — TELEPHONE (OUTPATIENT)
Dept: INTERNAL MEDICINE CLINIC | Facility: CLINIC | Age: 34
End: 2019-06-17

## 2019-06-17 NOTE — TELEPHONE ENCOUNTER
Griselda Gibson is calling she would like to speak with Dr. Donn Latham she has some concerns about Horace's health no additional info was provided  Flor's ph. # [phone] routed to clinical

## 2019-06-19 NOTE — TELEPHONE ENCOUNTER
Spoke with mom. Mom is concerned with her weight gain which she thinks is causing other problems. Has not had her menses for 2 months -- pt sees gyne Dr. Michelle Chery. Mom told her she should get a second opinion.   Mom states Jarred Cuellar is dating a man who

## 2019-07-10 ENCOUNTER — HOSPITAL ENCOUNTER (OUTPATIENT)
Dept: CT IMAGING | Facility: HOSPITAL | Age: 34
Discharge: HOME | End: 2019-07-10
Attending: UROLOGY
Payer: COMMERCIAL

## 2019-07-10 DIAGNOSIS — R31.29 MICROSCOPIC HEMATURIA: ICD-10-CM

## 2019-07-10 PROCEDURE — 74176 CT ABD & PELVIS W/O CONTRAST: CPT | Performed by: UROLOGY

## 2019-07-18 ENCOUNTER — TELEPHONE (OUTPATIENT)
Dept: INTERNAL MEDICINE CLINIC | Facility: CLINIC | Age: 34
End: 2019-07-18

## 2019-07-18 DIAGNOSIS — K76.0 FATTY LIVER: ICD-10-CM

## 2019-07-18 DIAGNOSIS — Z00.00 ROUTINE HEALTH MAINTENANCE: Primary | ICD-10-CM

## 2019-07-18 DIAGNOSIS — R53.83 OTHER FATIGUE: ICD-10-CM

## 2019-07-18 NOTE — TELEPHONE ENCOUNTER
Pt had a CT Abdomin & Pelvis done on 7/10 ordered by Dr Tree Trujillo  She would like to have Dr DOMINGUEZ call her to discuss the results, [phone]    Tasked to nursing

## 2019-07-19 NOTE — TELEPHONE ENCOUNTER
I do not know why she is seeing Dr. Debora Magdaleno. I recommend that she discuss with Dr. Debora Magdaleno first.  She does have some fatty liver seen on CT. She is due for an annual PE with me (I have not seen her for a year).   Rec that she schedule labs and a PE, an

## 2019-07-19 NOTE — TELEPHONE ENCOUNTER
Spoke to pt and advised on MD message below; pt verbalized understanding. Scheduled patient for PE 7/30/19. Pt advised on fasting labs; verbalized understanding.

## 2019-07-30 ENCOUNTER — OFFICE VISIT (OUTPATIENT)
Dept: INTERNAL MEDICINE CLINIC | Facility: CLINIC | Age: 34
End: 2019-07-30
Payer: COMMERCIAL

## 2019-07-30 VITALS
SYSTOLIC BLOOD PRESSURE: 130 MMHG | HEIGHT: 68.9 IN | DIASTOLIC BLOOD PRESSURE: 92 MMHG | WEIGHT: 213.63 LBS | TEMPERATURE: 98 F | OXYGEN SATURATION: 96 % | HEART RATE: 101 BPM | BODY MASS INDEX: 31.64 KG/M2

## 2019-07-30 DIAGNOSIS — Z00.00 ROUTINE HEALTH MAINTENANCE: ICD-10-CM

## 2019-07-30 DIAGNOSIS — J02.9 SORE THROAT: ICD-10-CM

## 2019-07-30 DIAGNOSIS — F98.8 ATTENTION DEFICIT DISORDER (ADD) WITHOUT HYPERACTIVITY: ICD-10-CM

## 2019-07-30 DIAGNOSIS — N91.2 AMENORRHEA: Primary | ICD-10-CM

## 2019-07-30 DIAGNOSIS — E66.3 SEVERELY OVERWEIGHT: ICD-10-CM

## 2019-07-30 DIAGNOSIS — R05.9 COUGH: ICD-10-CM

## 2019-07-30 PROCEDURE — 99214 OFFICE O/P EST MOD 30 MIN: CPT | Performed by: INTERNAL MEDICINE

## 2019-07-30 PROCEDURE — 90471 IMMUNIZATION ADMIN: CPT | Performed by: INTERNAL MEDICINE

## 2019-07-30 PROCEDURE — 90715 TDAP VACCINE 7 YRS/> IM: CPT | Performed by: INTERNAL MEDICINE

## 2019-07-30 PROCEDURE — 99395 PREV VISIT EST AGE 18-39: CPT | Performed by: INTERNAL MEDICINE

## 2019-07-30 RX ORDER — NORETHINDRONE ACETATE AND ETHINYL ESTRADIOL AND FERROUS FUMARATE 1MG-20(21)
1 KIT ORAL DAILY
Refills: 1 | COMMUNITY
Start: 2019-06-18 | End: 2019-09-26

## 2019-07-30 NOTE — PROGRESS NOTES
Nguyen Awan is a 30year old female. Patient presents with:  Physical: Paps per gyne Dr. James Rowland. Last pap done 2018, yearly. In search of new gyne. Hasn't had a regular period since mid sept. Has only spotted.  Taking birth control pills and has take kg)  07/19/17 : 191 lb (86.6 kg)    Body mass index is 31.63 kg/m². Current Outpatient Medications:  JUNEL FE 1/20 1-20 MG-MCG Oral Tab Take 1 tablet by mouth daily.  Disp:  Rfl: 1   Lisdexamfetamine Dimesylate (VYVANSE) 50 MG Oral Cap Take 1 capsul palpitations  GI: denies abdominal pain, nausea, vomiting, diarrhea, constipation, hematochezia, or melena  NEURO: denies headaches or dizziness    EXAM:   BP (!) 130/92 (BP Location: Right arm, Patient Position: Sitting)   Pulse 101   Temp 98.2 °F (36.8 ° given ongoing sx for 3 months. Pt with sore throat and strep exposure. Rapid strep neg.   Send throat cx, although clinically not c/w strep pharyngitis    RTC 1 yr/parveenn    Victorina Rivera, 07/30/19, 4:17 PM

## 2019-08-30 ENCOUNTER — APPOINTMENT (OUTPATIENT)
Dept: LAB | Facility: HOSPITAL | Age: 34
End: 2019-08-30
Attending: INTERNAL MEDICINE
Payer: COMMERCIAL

## 2019-08-30 ENCOUNTER — HOSPITAL ENCOUNTER (OUTPATIENT)
Dept: GENERAL RADIOLOGY | Facility: HOSPITAL | Age: 34
Discharge: HOME | End: 2019-08-30
Attending: INTERNAL MEDICINE
Payer: COMMERCIAL

## 2019-08-30 DIAGNOSIS — R05.9 COUGH: ICD-10-CM

## 2019-08-30 DIAGNOSIS — R53.83 OTHER FATIGUE: ICD-10-CM

## 2019-08-30 DIAGNOSIS — N91.2 AMENORRHEA: ICD-10-CM

## 2019-08-30 DIAGNOSIS — Z00.00 ROUTINE HEALTH MAINTENANCE: ICD-10-CM

## 2019-08-30 DIAGNOSIS — K76.0 FATTY LIVER: ICD-10-CM

## 2019-08-30 LAB
ALBUMIN SERPL-MCNC: 3.9 G/DL (ref 3.4–5)
ALBUMIN/GLOB SERPL: 1 {RATIO} (ref 1–2)
ALP LIVER SERPL-CCNC: 62 U/L (ref 37–98)
ALT SERPL-CCNC: 28 U/L (ref 13–56)
ANION GAP SERPL CALC-SCNC: 7 MMOL/L (ref 0–18)
AST SERPL-CCNC: 18 U/L (ref 15–37)
BASOPHILS # BLD AUTO: 0.08 X10(3) UL (ref 0–0.2)
BASOPHILS NFR BLD AUTO: 1.3 %
BILIRUB SERPL-MCNC: 0.4 MG/DL (ref 0.1–2)
BUN BLD-MCNC: 11 MG/DL (ref 7–18)
BUN/CREAT SERPL: 13.1 (ref 10–20)
CALCIUM BLD-MCNC: 9.4 MG/DL (ref 8.5–10.1)
CHLORIDE SERPL-SCNC: 107 MMOL/L (ref 98–112)
CHOLEST SMN-MCNC: 183 MG/DL (ref ?–200)
CO2 SERPL-SCNC: 27 MMOL/L (ref 21–32)
CREAT BLD-MCNC: 0.84 MG/DL (ref 0.55–1.02)
DEPRECATED RDW RBC AUTO: 40.8 FL (ref 35.1–46.3)
EOSINOPHIL # BLD AUTO: 0.23 X10(3) UL (ref 0–0.7)
EOSINOPHIL NFR BLD AUTO: 3.7 %
ERYTHROCYTE [DISTWIDTH] IN BLOOD BY AUTOMATED COUNT: 12.3 % (ref 11–15)
EST. AVERAGE GLUCOSE BLD GHB EST-MCNC: 103 MG/DL (ref 68–126)
GLOBULIN PLAS-MCNC: 3.9 G/DL (ref 2.8–4.4)
GLUCOSE BLD-MCNC: 91 MG/DL (ref 70–99)
HBA1C MFR BLD HPLC: 5.2 % (ref ?–5.7)
HCT VFR BLD AUTO: 44.2 % (ref 35–48)
HDLC SERPL-MCNC: 62 MG/DL (ref 40–59)
HGB BLD-MCNC: 14.7 G/DL (ref 12–16)
IMM GRANULOCYTES # BLD AUTO: 0.02 X10(3) UL (ref 0–1)
IMM GRANULOCYTES NFR BLD: 0.3 %
LDLC SERPL CALC-MCNC: 95 MG/DL (ref ?–100)
LYMPHOCYTES # BLD AUTO: 2.08 X10(3) UL (ref 1–4)
LYMPHOCYTES NFR BLD AUTO: 33.3 %
M PROTEIN MFR SERPL ELPH: 7.8 G/DL (ref 6.4–8.2)
MCH RBC QN AUTO: 30.4 PG (ref 26–34)
MCHC RBC AUTO-ENTMCNC: 33.3 G/DL (ref 31–37)
MCV RBC AUTO: 91.3 FL (ref 80–100)
MONOCYTES # BLD AUTO: 0.53 X10(3) UL (ref 0.1–1)
MONOCYTES NFR BLD AUTO: 8.5 %
NEUTROPHILS # BLD AUTO: 3.31 X10 (3) UL (ref 1.5–7.7)
NEUTROPHILS # BLD AUTO: 3.31 X10(3) UL (ref 1.5–7.7)
NEUTROPHILS NFR BLD AUTO: 52.9 %
NONHDLC SERPL-MCNC: 121 MG/DL (ref ?–130)
OSMOLALITY SERPL CALC.SUM OF ELEC: 291 MOSM/KG (ref 275–295)
PATIENT FASTING: YES
PATIENT FASTING: YES
PLATELET # BLD AUTO: 367 10(3)UL (ref 150–450)
POTASSIUM SERPL-SCNC: 4.2 MMOL/L (ref 3.5–5.1)
PROLACTIN SERPL-MCNC: 12.3 NG/ML
RBC # BLD AUTO: 4.84 X10(6)UL (ref 3.8–5.3)
SODIUM SERPL-SCNC: 141 MMOL/L (ref 136–145)
TESTOST SERPL-MCNC: 24.99 NG/DL
TRIGL SERPL-MCNC: 131 MG/DL (ref 30–149)
TSI SER-ACNC: 2.71 MIU/ML (ref 0.36–3.74)
VLDLC SERPL CALC-MCNC: 26 MG/DL (ref 0–30)
WBC # BLD AUTO: 6.3 X10(3) UL (ref 4–11)

## 2019-08-30 PROCEDURE — 84146 ASSAY OF PROLACTIN: CPT

## 2019-08-30 PROCEDURE — 82627 DEHYDROEPIANDROSTERONE: CPT

## 2019-08-30 PROCEDURE — 80061 LIPID PANEL: CPT

## 2019-08-30 PROCEDURE — 85025 COMPLETE CBC W/AUTO DIFF WBC: CPT

## 2019-08-30 PROCEDURE — 84403 ASSAY OF TOTAL TESTOSTERONE: CPT

## 2019-08-30 PROCEDURE — 83036 HEMOGLOBIN GLYCOSYLATED A1C: CPT | Performed by: INTERNAL MEDICINE

## 2019-08-30 PROCEDURE — 84443 ASSAY THYROID STIM HORMONE: CPT | Performed by: INTERNAL MEDICINE

## 2019-08-30 PROCEDURE — 71046 X-RAY EXAM CHEST 2 VIEWS: CPT | Performed by: INTERNAL MEDICINE

## 2019-08-30 PROCEDURE — 36415 COLL VENOUS BLD VENIPUNCTURE: CPT | Performed by: INTERNAL MEDICINE

## 2019-08-30 PROCEDURE — 80053 COMPREHEN METABOLIC PANEL: CPT

## 2019-09-01 LAB — DEHYDROEPIANDROSTERONE BY TMS: 8.31 NG/ML

## 2019-09-04 ENCOUNTER — OFFICE VISIT (OUTPATIENT)
Dept: OBGYN CLINIC | Facility: CLINIC | Age: 34
End: 2019-09-04
Payer: COMMERCIAL

## 2019-09-04 VITALS
HEIGHT: 70 IN | BODY MASS INDEX: 30.35 KG/M2 | HEART RATE: 88 BPM | SYSTOLIC BLOOD PRESSURE: 130 MMHG | DIASTOLIC BLOOD PRESSURE: 84 MMHG | WEIGHT: 212 LBS

## 2019-09-04 DIAGNOSIS — N91.4 SECONDARY OLIGOMENORRHEA: ICD-10-CM

## 2019-09-04 DIAGNOSIS — Z12.4 SCREENING FOR MALIGNANT NEOPLASM OF CERVIX: ICD-10-CM

## 2019-09-04 DIAGNOSIS — Z01.419 ENCOUNTER FOR GYNECOLOGICAL EXAMINATION WITHOUT ABNORMAL FINDING: Primary | ICD-10-CM

## 2019-09-04 PROCEDURE — 99203 OFFICE O/P NEW LOW 30 MIN: CPT | Performed by: OBSTETRICS & GYNECOLOGY

## 2019-09-04 PROCEDURE — 99385 PREV VISIT NEW AGE 18-39: CPT | Performed by: OBSTETRICS & GYNECOLOGY

## 2019-09-04 NOTE — PROGRESS NOTES
Nguyen Awan is a 30year old female No obstetric history on file. Patient's last menstrual period was 08/28/2019. Patient presents with: Annual  Pt had been using nuvaring for at least 3-4 years and was not getting menses for the last year on it.   She file        Non-medical: Not on file    Tobacco Use      Smoking status: Never Smoker      Smokeless tobacco: Never Used    Substance and Sexual Activity      Alcohol use: Yes        Frequency: 2-3 times a week        Drinks per session: 1 or 2        Rubia MG Oral Cap, Take 1 capsule (50 mg total) by mouth every morning., Disp: 30 capsule, Rfl: 0  •  ESCITALOPRAM 10 MG Oral Tab, TAKE 1 TABLET BY MOUTH ONCE DAILY, Disp: 90 tablet, Rfl: 3  •  Ascorbic Acid (VITAMIN C OR), Take by mouth daily. , Disp: , Rfl:   • nipple retraction or skin changes  Respiratory:  lungs clear to auscultation bilaterally  Cardiovascular: regular rate and rhythm, no significant murmur  Abdomen:  soft, nontender, nondistended, no masses  Skin/Hair: no unusual rashes or bruises  Extremiti

## 2019-09-05 LAB — HPV I/H RISK 1 DNA SPEC QL NAA+PROBE: NEGATIVE

## 2019-09-26 ENCOUNTER — TELEPHONE (OUTPATIENT)
Dept: OBGYN CLINIC | Facility: CLINIC | Age: 34
End: 2019-09-26

## 2019-09-26 RX ORDER — NORETHINDRONE ACETATE AND ETHINYL ESTRADIOL AND FERROUS FUMARATE 1MG-20(21)
1 KIT ORAL DAILY
Qty: 1 PACKAGE | Refills: 0 | Status: SHIPPED | OUTPATIENT
Start: 2019-09-26 | End: 2019-09-28

## 2019-09-26 NOTE — TELEPHONE ENCOUNTER
MESSAGE REVIEWED WITH [facility] OVER THE PHONE. HE APPROVED 1 PACK ONLY AND ADDITIONAL REFILLS NEED TO COME FROM [facility]. PT NOTIFIED 1 PACK SENT TO HER PHARMACY. WILL NOTIFY HER WHEN [facility] RESPONDS.

## 2019-09-26 NOTE — TELEPHONE ENCOUNTER
Pt had her first annual with Dr. Selene Ramirez on 9/4, states she was getting birthcontrol from a previous Dr. [address] states she just ran out of her [facility] and wondering if can get a refill or will need a new prescription for her [facility].  Please advise

## 2019-09-26 NOTE — TELEPHONE ENCOUNTER
PT NEEDS TO START A NEW PACK OF PILLS THIS Saturday. STATES [facility] TOLD HER SPECIFICALLY THAT ONE OF HER PARTNERS WOULD FILL HER RX IF SHE WASN'T IN THE OFFICE. PT SAW [facility] FOR ANNUAL AS NEW PT ON 9-4-19. WILL FORWARD TO [employer] (ON CALL) FOR POSSIBLE APPROVAL.

## 2019-09-28 RX ORDER — NORETHINDRONE ACETATE AND ETHINYL ESTRADIOL 1MG-20(21)
1 KIT ORAL DAILY
Qty: 1 PACKAGE | Refills: 11 | Status: SHIPPED | OUTPATIENT
Start: 2019-09-28 | End: 2020-10-08

## 2019-10-01 ENCOUNTER — TELEPHONE (OUTPATIENT)
Dept: INTERNAL MEDICINE CLINIC | Facility: CLINIC | Age: 34
End: 2019-10-01

## 2019-10-01 NOTE — TELEPHONE ENCOUNTER
Pt. Is requesting 3 new Rx's for Vyvanse pt. Thinks she may have thrown out Rx's please advise Aurora Moore (mom) will  Rx once donept. Is out of meds  ph.  # [phone]  Routed to Rx

## 2019-10-02 NOTE — TELEPHONE ENCOUNTER
Spoke with Dallas Avila to inform her RX is ready for  at [facility]  (2nd floor); She is sending her mom Keri Boston to pick these up.

## 2019-10-23 ENCOUNTER — TELEPHONE (OUTPATIENT)
Dept: OBGYN CLINIC | Facility: CLINIC | Age: 34
End: 2019-10-23

## 2019-10-23 NOTE — TELEPHONE ENCOUNTER
Pt informed that 1 months with 11 refills was sent on 9/28/19 per [facility]'s recs. A message was left for pt with that info. Pt states she did get that message. PT will check with [facility] for the rx. Pt advised to call us back if there is an issue.

## 2020-01-02 ENCOUNTER — TELEPHONE (OUTPATIENT)
Dept: INTERNAL MEDICINE CLINIC | Facility: CLINIC | Age: 35
End: 2020-01-02

## 2020-01-02 NOTE — TELEPHONE ENCOUNTER
To MD:  The above refill request is for a controlled substance. Please review pended medication order. Print and sign for staff to fax to pharmacy or prescribe electronically.     Victoriano Juan  - 12/3/2019 #30        Last refilled-   3/20/2019 #30

## 2020-01-03 NOTE — TELEPHONE ENCOUNTER
Pt called to check status on refills  Needs today  Can on call doctor refill at least one month today    Please advise [phone]

## 2020-02-03 RX ORDER — LISDEXAMFETAMINE DIMESYLATE CAPSULES 50 MG/1
50 CAPSULE ORAL DAILY
Qty: 30 CAPSULE | Refills: 0 | Status: SHIPPED | OUTPATIENT
Start: 2020-02-03 | End: 2020-12-22 | Stop reason: DRUGHIGH

## 2020-02-03 NOTE — TELEPHONE ENCOUNTER
To Dr. Emanuel Abdalla---    Please review in MD absence. The above refill request is for a controlled substance. Please review pended medication order.

## 2020-02-03 NOTE — TELEPHONE ENCOUNTER
Last day of medication is today  She didn't realize this  Asks for refill today of Vyvanse  Please send to [id number] to patient at [phone]

## 2020-03-10 ENCOUNTER — TELEPHONE (OUTPATIENT)
Dept: INTERNAL MEDICINE CLINIC | Facility: CLINIC | Age: 35
End: 2020-03-10

## 2020-03-10 NOTE — TELEPHONE ENCOUNTER
To MD:  The above refill request is for a controlled substance. Please review pended medication order. Print and sign for staff to fax to pharmacy or prescribe electronically.     [place]  2/3/2020 #30    Last refilled - 2/3/2020 #30/0

## 2020-03-13 RX ORDER — ESCITALOPRAM OXALATE 10 MG/1
TABLET ORAL
Qty: 90 TABLET | Refills: 3 | Status: SHIPPED | OUTPATIENT
Start: 2020-03-13 | End: 2020-05-06 | Stop reason: DRUGHIGH

## 2020-04-14 NOTE — TELEPHONE ENCOUNTER
I sent in a 3 month supply of Vyvanse on 3/10/20 (see telephone encounter).    There is still a refill for 4/10/20 and 5/11/20 on our med list.  It should be on record at her pharmacy

## 2020-04-15 NOTE — TELEPHONE ENCOUNTER
Spoke with pharmacist at [facility] and she confirmed that Rx's for April and May are on file for Vyvanse. She will fill. Called patient and left message (ok per HIPAA) that Rx's were at [facility] and to contact them for refills.

## 2020-04-30 ENCOUNTER — PATIENT MESSAGE (OUTPATIENT)
Dept: INTERNAL MEDICINE CLINIC | Facility: CLINIC | Age: 35
End: 2020-04-30

## 2020-05-01 NOTE — TELEPHONE ENCOUNTER
From: Bethany Rainey  To: Jessie Bojorquez MD  Sent: 4/30/2020 5:36 PM CDT  Subject: Prescription Question    Hi Doctor Kel Alvarez,     I hope you are well. My anxiety symptoms are more frequent these days. I am still having all the same symptoms.  Headaches,

## 2020-05-01 NOTE — TELEPHONE ENCOUNTER
To Dr. Kirstie Powell---    Spoke to patient and advised MD out of office until Tuesday. Patient verbalized understanding and wanted to wait for MD review.

## 2020-05-06 RX ORDER — ESCITALOPRAM OXALATE 20 MG/1
20 TABLET ORAL DAILY
Qty: 90 TABLET | Refills: 3 | Status: SHIPPED | OUTPATIENT
Start: 2020-05-06 | End: 2021-04-15

## 2020-06-16 ENCOUNTER — TELEPHONE (OUTPATIENT)
Dept: INTERNAL MEDICINE CLINIC | Facility: CLINIC | Age: 35
End: 2020-06-16

## 2020-07-15 NOTE — TELEPHONE ENCOUNTER
Spoke to Buster Marques from patient pharmacy-- patient still has 2 months of valid refills, just can't  until tomorrow. Spoke with patient and explained timing of refills and that she has active refill available for July starting tomorrow.  Advised she ca

## 2020-07-28 ENCOUNTER — TELEPHONE (OUTPATIENT)
Dept: OBGYN CLINIC | Facility: CLINIC | Age: 35
End: 2020-07-28

## 2020-07-28 NOTE — TELEPHONE ENCOUNTER
LAST ANNUAL 9-4-19 (PAP NORMAL) AND NO FUTURE APPT MADE. RECEIVED FAX ASKING FOR SUBSTITUTION OF JUNEL 1/20, 21 DAY (NO FE). OK TO SUBSTITUTE?

## 2020-07-29 NOTE — TELEPHONE ENCOUNTER
LMTCB.  [facility] stated ok to substitute her ocp until her next annual exam is due.  (Sept 2020)   Pt needs to schedule her Annual in Sept.    Placed a verbal order with pharmacist, Kalina Agee, at [place] ([phone]),  for Norethindrone Ethinyl Estradiol 1/20, 2

## 2020-08-15 ENCOUNTER — PATIENT MESSAGE (OUTPATIENT)
Dept: INTERNAL MEDICINE CLINIC | Facility: CLINIC | Age: 35
End: 2020-08-15

## 2020-08-17 NOTE — TELEPHONE ENCOUNTER
From: Bernard Herrera  To: Amol Grissom MD  Sent: 8/15/2020 4:25 PM CDT  Subject: Other    Hi Doctor Yane Anthony,    I just wanted to let you know that today I received positive test results for covid-19.      For the most part I am fine and have already be

## 2020-08-17 NOTE — TELEPHONE ENCOUNTER
Notified pt of message below and assisted with scheduling annual on 10/26/20 (pt requesting appt at 4 or later). Pt answered yes to +Covid test result within 14 days. States she is doing well.

## 2020-08-18 RX ORDER — ALBUTEROL SULFATE 90 UG/1
1 AEROSOL, METERED RESPIRATORY (INHALATION) EVERY 6 HOURS PRN
Qty: 1 INHALER | Refills: 1 | Status: SHIPPED | OUTPATIENT
Start: 2020-08-18

## 2020-08-18 NOTE — TELEPHONE ENCOUNTER
Per Dr. Ron Sepulveda for 10 days, drink fluids, rest. If symptoms worsen and SOB becomes severe enough where she can not walk across the room without difficulty, call 911 or go to nearest ER. Spoke with pt. She voiced understanding.  She reque

## 2020-08-27 ENCOUNTER — PATIENT MESSAGE (OUTPATIENT)
Dept: INTERNAL MEDICINE CLINIC | Facility: CLINIC | Age: 35
End: 2020-08-27

## 2020-08-27 NOTE — TELEPHONE ENCOUNTER
From: Divya Ards  To: Nidhi Brewer MD  Sent: 8/27/2020 1:56 PM CDT  Subject: Non-Urgent Medical Question    Hi Doctor Bry Vides,     I hope you and your family are are well. I am following up with you in regards to my covid-19 status.  Luke Camargo

## 2020-09-17 ENCOUNTER — TELEPHONE (OUTPATIENT)
Dept: INTERNAL MEDICINE CLINIC | Facility: CLINIC | Age: 35
End: 2020-09-17

## 2020-09-29 RX ORDER — NORETHINDRONE ACETATE AND ETHINYL ESTRADIOL 1; .02 MG/1; MG/1
TABLET ORAL
Qty: 63 TABLET | Refills: 0 | Status: SHIPPED | OUTPATIENT
Start: 2020-09-29 | End: 2020-10-26

## 2020-10-08 ENCOUNTER — OFFICE VISIT (OUTPATIENT)
Dept: INTERNAL MEDICINE CLINIC | Facility: CLINIC | Age: 35
End: 2020-10-08
Payer: COMMERCIAL

## 2020-10-08 VITALS
HEART RATE: 77 BPM | WEIGHT: 221 LBS | SYSTOLIC BLOOD PRESSURE: 118 MMHG | RESPIRATION RATE: 16 BRPM | OXYGEN SATURATION: 98 % | BODY MASS INDEX: 31.64 KG/M2 | HEIGHT: 70 IN | TEMPERATURE: 98 F | DIASTOLIC BLOOD PRESSURE: 84 MMHG

## 2020-10-08 DIAGNOSIS — F98.8 ATTENTION DEFICIT DISORDER (ADD) WITHOUT HYPERACTIVITY: ICD-10-CM

## 2020-10-08 DIAGNOSIS — J30.2 SEASONAL ALLERGIES: ICD-10-CM

## 2020-10-08 DIAGNOSIS — Z00.00 ROUTINE HEALTH MAINTENANCE: Primary | ICD-10-CM

## 2020-10-08 DIAGNOSIS — R53.83 OTHER FATIGUE: ICD-10-CM

## 2020-10-08 DIAGNOSIS — F41.9 ANXIETY: ICD-10-CM

## 2020-10-08 PROCEDURE — 99395 PREV VISIT EST AGE 18-39: CPT | Performed by: INTERNAL MEDICINE

## 2020-10-08 PROCEDURE — 3008F BODY MASS INDEX DOCD: CPT | Performed by: INTERNAL MEDICINE

## 2020-10-08 PROCEDURE — 90686 IIV4 VACC NO PRSV 0.5 ML IM: CPT | Performed by: INTERNAL MEDICINE

## 2020-10-08 PROCEDURE — 3079F DIAST BP 80-89 MM HG: CPT | Performed by: INTERNAL MEDICINE

## 2020-10-08 PROCEDURE — 90471 IMMUNIZATION ADMIN: CPT | Performed by: INTERNAL MEDICINE

## 2020-10-08 PROCEDURE — 3074F SYST BP LT 130 MM HG: CPT | Performed by: INTERNAL MEDICINE

## 2020-10-08 RX ORDER — FEXOFENADINE HCL AND PSEUDOEPHEDRINE HCI 180; 240 MG/1; MG/1
1 TABLET, EXTENDED RELEASE ORAL DAILY PRN
Qty: 90 TABLET | Refills: 3 | Status: SHIPPED | OUTPATIENT
Start: 2020-10-08 | End: 2020-10-09

## 2020-10-08 NOTE — PROGRESS NOTES
Yosvany Comment is a 37year old female. Patient presents with: Annual: Pt presents for annual physical. Denies any complaints at this time, states she feels well. Sees gyne for paps.   Allergies: Would like prescription for Allegra-D.       HPI:   Emily Diallo Multiple Vitamins-Minerals (MULTIPLE VITAMINS/WOMENS) Oral Tab Take 1 tablet by mouth daily. 0   • Lisdexamfetamine Dimesylate (VYVANSE) 50 MG Oral Cap Take 1 capsule (50 mg total) by mouth daily.  (Patient not taking: Reported on 10/8/2020 ) 30 capsule apparent distress  HEENT: normal oropharynx, no redness or exudate, normal TM's  EYES: PERRLA, EOMI, conjunctivae are pink  NECK: supple, no cervical or supraclavicular LAD, no carotid bruits  BREAST: no dominant or suspicious mass, no axillary LAD  LUNGS:

## 2020-10-09 ENCOUNTER — PATIENT MESSAGE (OUTPATIENT)
Dept: INTERNAL MEDICINE CLINIC | Facility: CLINIC | Age: 35
End: 2020-10-09

## 2020-10-09 RX ORDER — FEXOFENADINE HCL AND PSEUDOEPHEDRINE HCI 180; 240 MG/1; MG/1
1 TABLET, EXTENDED RELEASE ORAL DAILY PRN
Qty: 90 TABLET | Refills: 3 | Status: SHIPPED | OUTPATIENT
Start: 2020-10-09

## 2020-10-09 NOTE — TELEPHONE ENCOUNTER
From: Lizzy Claire  To: Jacy Flor MD  Sent: 10/9/2020 2:46 PM CDT  Subject: Prescription Question    Doctor Preston Valle,     I spoke with my [facility] in regards to my new Allegra-D prescription that you wrote me yesterday, and they said that because

## 2020-10-22 NOTE — TELEPHONE ENCOUNTER
Pt called, this was written as 24 hour and should be 12 hour  Pt would use [facility] as pharmacy for this   Tasked to [employer]

## 2020-10-22 NOTE — TELEPHONE ENCOUNTER
Pt should have one refill left at [facility]. Attempted to reach pt, no answer no vm. Not iT message sent to pt advising her to contact pharmacy for remaining refill.

## 2020-10-22 NOTE — TELEPHONE ENCOUNTER
The patient is a 69y Female complaining of fever. To Dr Toyin Valentin see message from -----patient asking for 12 hour Allegra D, NOT 24 hour Allegra D which was sent already.     I started to re pend the 12 hour allegra D--please review and advise

## 2020-10-22 NOTE — TELEPHONE ENCOUNTER
Pt calling, notified of below message. Pt will call her pharmacy and notify us if she has any further issues.

## 2020-10-26 ENCOUNTER — OFFICE VISIT (OUTPATIENT)
Dept: OBGYN CLINIC | Facility: CLINIC | Age: 35
End: 2020-10-26
Payer: COMMERCIAL

## 2020-10-26 VITALS
HEART RATE: 75 BPM | DIASTOLIC BLOOD PRESSURE: 81 MMHG | WEIGHT: 224 LBS | SYSTOLIC BLOOD PRESSURE: 122 MMHG | BODY MASS INDEX: 32 KG/M2

## 2020-10-26 DIAGNOSIS — Z01.419 ENCOUNTER FOR GYNECOLOGICAL EXAMINATION WITHOUT ABNORMAL FINDING: Primary | ICD-10-CM

## 2020-10-26 PROCEDURE — 3074F SYST BP LT 130 MM HG: CPT | Performed by: OBSTETRICS & GYNECOLOGY

## 2020-10-26 PROCEDURE — 3079F DIAST BP 80-89 MM HG: CPT | Performed by: OBSTETRICS & GYNECOLOGY

## 2020-10-26 PROCEDURE — 99395 PREV VISIT EST AGE 18-39: CPT | Performed by: OBSTETRICS & GYNECOLOGY

## 2020-10-26 RX ORDER — NORETHINDRONE ACETATE AND ETHINYL ESTRADIOL 1; .02 MG/1; MG/1
1 TABLET ORAL DAILY
Qty: 3 PACKAGE | Refills: 3 | Status: SHIPPED | OUTPATIENT
Start: 2020-10-26 | End: 2021-12-06

## 2020-10-26 NOTE — PROGRESS NOTES
Sierra Lassiter is a 37year old female [facility] Patient's last menstrual period was 10/24/2020 (approximate). Patient presents with:  Gyn Exam: Annual  Medication Request: OCP refill  . Her cycles are regular. She has no complaints.     OBSTETRICS HIST sexual activity: Not on file    Other Topics      Concerns:         Service: Not Asked        Blood Transfusions: Not Asked        Caffeine Concern: Yes          Coffee 1 cup daily        Occupational Exposure: Not Asked        Hobby Hazards: Not A Disp: 90 tablet, Rfl: 3  •  Lisdexamfetamine Dimesylate (VYVANSE) 50 MG Oral Cap, Take 1 capsule (50 mg total) by mouth daily.  (Patient not taking: Reported on 10/8/2020 ), Disp: 30 capsule, Rfl: 0  •  [START ON 11/18/2020] Lisdexamfetamine Dimesylate (VYV mood and affect    Pelvic Exam:  External Genitalia: normal appearance, hair distribution, and no lesions  Urethral Meatus:  normal in size, location, without lesions and prolapse  Bladder:  No fullness, masses or tenderness  Vagina:  Normal appearance wit

## 2020-10-29 RX ORDER — FEXOFENADINE HCL AND PSEUDOEPHEDRINE HCI 60; 120 MG/1; MG/1
1 TABLET, EXTENDED RELEASE ORAL 2 TIMES DAILY PRN
Qty: 60 TABLET | Refills: 5 | Status: SHIPPED | OUTPATIENT
Start: 2020-10-29 | End: 2021-03-15

## 2020-11-24 NOTE — TELEPHONE ENCOUNTER
Spoke with Renay at [facility] who advised they did received 9/17 rx for vyvanse and will fill now for patient.     LM for patient advising rx will be filled per pharmacy and ready for PU tonight (ok per hipaa)

## 2020-11-24 NOTE — TELEPHONE ENCOUNTER
Patient calling to request a refill on her Lisdexamfetamine Dimesylate (VYVANSE) 50 MG Oral Cap. Patient states on her account at [facility] it states she is not due for a refill until 12/16. But patient is out now.

## 2020-12-10 ENCOUNTER — PATIENT MESSAGE (OUTPATIENT)
Dept: INTERNAL MEDICINE CLINIC | Facility: CLINIC | Age: 35
End: 2020-12-10

## 2020-12-11 NOTE — TELEPHONE ENCOUNTER
From: Mina Barrientos  To: Willie Puri MD  Sent: 12/10/2020 4:05 PM CST  Subject: Prescription Question    Hi Doctor Laurance Goodpasture,  I believe I am having some adverse affects from my lexepro.  I am not sure if perhaps the manufacturers have changed or somet

## 2020-12-15 NOTE — TELEPHONE ENCOUNTER
Patient called per  and scheduled the patient for a phone visit, per Dr Laurance Goodpasture' request below, for Tuesday 12/22 at 3pm (patient wanted to speak with Dr Laurance Goodpasture sooner rather than later).

## 2020-12-22 NOTE — PROGRESS NOTES
Virtual Telephone Check-In    Anjana Frank verbally consents to a Virtual/Telephone Check-In visit on 12/22/20. Patient has been referred to the [facility] website at [email or web address] to review the yearly Consent to Treat document.     Patient Levell Hunger

## 2021-01-22 NOTE — TELEPHONE ENCOUNTER
Patient is calling to request a refill on her Lisdexamfetamine Dimesylate (VYVANSE) 60 MG Oral Cap patient is currently out of the medication.

## 2021-01-24 NOTE — TELEPHONE ENCOUNTER
To MD:  The above refill request is for a controlled substance. Please review pended medication order. Print and sign for staff to fax to pharmacy or prescribe electronically.     Last OV - 12/22/20 (virtual visit)  Last refill sent - 12/22/20 for #30

## 2021-01-26 NOTE — TELEPHONE ENCOUNTER
Please let pt know eRx was sent for 90 days (she will get 30 day supply at a time, then just needs to call pharmacy for next refill)

## 2021-01-28 NOTE — TELEPHONE ENCOUNTER
S/w patient and Relayed MD's message to patient---verbalized understanding    FYI Dr. Tatiana Ontiveros wanted MD to be made aware that she is doing well on this Vyvanse dose. The previous refill, was the first time she took it.

## 2021-02-25 ENCOUNTER — TELEPHONE (OUTPATIENT)
Dept: INTERNAL MEDICINE CLINIC | Facility: CLINIC | Age: 36
End: 2021-02-25

## 2021-02-25 NOTE — TELEPHONE ENCOUNTER
I spoke with Jax and let her know that Dr. Francine Salas is referring her to see Yuki Silva at [facility]. She can call their office at [phone]. Explained that she should let their office know that Dr. Francine Salas from [address] is referring her to see [facility]. Jordan verbalized understanding.

## 2021-05-22 NOTE — TELEPHONE ENCOUNTER
To Tyrell, please advise if you are taking over refills for this prescription.  Last refilled by you 4/15/21 #30

## 2021-05-24 RX ORDER — ESCITALOPRAM OXALATE 20 MG/1
TABLET ORAL
Qty: 30 TABLET | Refills: 0 | Status: SHIPPED | OUTPATIENT
Start: 2021-05-24 | End: 2021-06-03

## 2021-05-24 NOTE — TELEPHONE ENCOUNTER
Requested Prescriptions     Signed Prescriptions Disp Refills   • ESCITALOPRAM 20 MG Oral Tab 30 tablet 0     Sig: TAKE 1 TABLET(20 MG) BY MOUTH DAILY     Authorizing Provider: Rachael Hussein

## 2021-06-02 NOTE — TELEPHONE ENCOUNTER
Requested Prescriptions     Signed Prescriptions Disp Refills   • Lisdexamfetamine Dimesylate (VYVANSE) 60 MG Oral Cap 30 capsule 0     Sig: Take 1 capsule (60 mg total) by mouth every morning.      Authorizing Provider: [facility] Villaf[facility]te

## 2021-06-02 NOTE — TELEPHONE ENCOUNTER
Patient is calling today to request a refill on her Lisdexamfetamine Dimesylate (VYVANSE) 60 MG Oral Cap. Patient states she only has three pills left at this time.      [facility] in [place]

## 2021-06-10 ENCOUNTER — TELEPHONE (OUTPATIENT)
Dept: INTERNAL MEDICINE CLINIC | Facility: CLINIC | Age: 36
End: 2021-06-10

## 2021-06-15 RX ORDER — FEXOFENADINE HYDROCHLORIDE AND PSEUDOEPHEDRINE HYDROCHLORIDE 60; 120 MG/1; MG/1
TABLET, FILM COATED, EXTENDED RELEASE ORAL
Qty: 60 TABLET | Refills: 5 | Status: SHIPPED | OUTPATIENT
Start: 2021-06-15 | End: 2021-12-30

## 2021-06-28 RX ORDER — ESCITALOPRAM OXALATE 20 MG/1
20 TABLET ORAL DAILY
Qty: 90 TABLET | Refills: 0 | OUTPATIENT
Start: 2021-06-28

## 2021-06-28 NOTE — TELEPHONE ENCOUNTER
Refill request has failed the Ambulatory Medication Refill Standing Order and is routed to the primary physician to review the following:    Requested Prescriptions     Refused Prescriptions Disp Refills   • escitalopram 20 MG Oral Tab 90 tablet 0     Sig:

## 2021-11-18 ENCOUNTER — TELEPHONE (OUTPATIENT)
Dept: INTERNAL MEDICINE CLINIC | Facility: CLINIC | Age: 36
End: 2021-11-18

## 2021-11-18 NOTE — TELEPHONE ENCOUNTER
Patient is calling to request a refill on her Lisdexamfetamine Dimesylate (VYVANSE) 60 MG Oral Cap. Patient states she was given three scripts to use for August, September, October.  Patient states she did not have any health coverage in October and had to

## 2021-11-19 NOTE — TELEPHONE ENCOUNTER
To Dr. Eda Fernandez-- can you please advise on refill as patient is out of meds and Dr. Donn Latham will not be back in office until Tues? Dr. Laurance Goodpasture has prescribed this in the past but was most recently prescribed by Shruti Cobos.  Overdue for physical but has an appt anastacio

## 2021-11-24 DIAGNOSIS — Z00.00 ROUTINE HEALTH MAINTENANCE: Primary | ICD-10-CM

## 2021-11-24 RX ORDER — BUPROPION HYDROCHLORIDE 150 MG/1
150 TABLET ORAL EVERY MORNING
Qty: 90 TABLET | Refills: 3 | Status: SHIPPED | OUTPATIENT
Start: 2021-11-24

## 2021-11-24 NOTE — TELEPHONE ENCOUNTER
To Dr. Nelly Vasquez to please advise-- previously sent by Luna Avalos, never sent by our office before. Patient overdue for a physical but has appt scheduled for 12/14/21. Thanks!

## 2021-11-24 NOTE — TELEPHONE ENCOUNTER
Spoke to patient and notified them fasting lab orders placed. Advised patient to do these a few days prior to the appointment if possible.

## 2021-11-24 NOTE — TELEPHONE ENCOUNTER
Pt is calling for a refill for     buPROPion 150 MG    Patient only has 1 pill left. Patient states this medication is the most important one for her mental health.     Please send to [facility] in [address]

## 2021-12-06 RX ORDER — NORETHINDRONE ACETATE AND ETHINYL ESTRADIOL 1; .02 MG/1; MG/1
1 TABLET ORAL DAILY
Qty: 28 TABLET | Refills: 0 | Status: SHIPPED | OUTPATIENT
Start: 2021-12-06 | End: 2022-01-04

## 2021-12-06 RX ORDER — NORETHINDRONE ACETATE AND ETHINYL ESTRADIOL 1; .02 MG/1; MG/1
1 TABLET ORAL DAILY
Qty: 63 TABLET | Refills: 0 | OUTPATIENT
Start: 2021-12-06

## 2021-12-06 NOTE — TELEPHONE ENCOUNTER
Requested Prescriptions     Pending Prescriptions Disp Refills   • NORETHINDRONE ACET-ETHINYL EST 1-20 MG-MCG Oral Tab [Pharmacy Med Name: NORETHINDRONE ACET/ETH 1/20 TB 21'S] 63 tablet 0     Sig: TAKE 1 TABLET BY MOUTH DAILY     Last annual 10/26/20  Last

## 2021-12-13 ENCOUNTER — LAB ENCOUNTER (OUTPATIENT)
Dept: LAB | Age: 36
End: 2021-12-13
Attending: INTERNAL MEDICINE
Payer: COMMERCIAL

## 2021-12-13 DIAGNOSIS — Z00.00 ROUTINE HEALTH MAINTENANCE: ICD-10-CM

## 2021-12-13 PROCEDURE — 85025 COMPLETE CBC W/AUTO DIFF WBC: CPT

## 2021-12-13 PROCEDURE — 36415 COLL VENOUS BLD VENIPUNCTURE: CPT

## 2021-12-13 PROCEDURE — 80053 COMPREHEN METABOLIC PANEL: CPT

## 2021-12-13 PROCEDURE — 82306 VITAMIN D 25 HYDROXY: CPT

## 2021-12-13 PROCEDURE — 80061 LIPID PANEL: CPT

## 2021-12-13 PROCEDURE — 84443 ASSAY THYROID STIM HORMONE: CPT | Performed by: INTERNAL MEDICINE

## 2021-12-14 NOTE — PROGRESS NOTES
Bethany Rainey is a 31year old female. Patient presents with:  Physical: Here today for Annual Physical Exam       HPI:   Bethany Rainey is a 31year old female who presents for a complete physical exam.       Feels well. Got  in 6/2021.   Still loo needed. 90 tablet 3   • ELDERBERRY OR Take by mouth daily. • Albuterol Sulfate  (90 Base) MCG/ACT Inhalation Aero Soln Inhale 1 puff into the lungs every 6 (six) hours as needed for Shortness of Breath.  1 Inhaler 1   • Ascorbic Acid (VITAMIN C O PLAN:     Routine health maintenance  Paps per gyne, Dr. Rogers Gordon. FBS normal; lipids pending. Tdap 7/30/19. Encouraged more exercise.     Had Pfizer covid vaccines x 2; encouraged booster  Flu shot today.      NAFLD  Seen on CT abd/pelvis ordered by

## 2021-12-28 ENCOUNTER — TELEPHONE (OUTPATIENT)
Dept: INTERNAL MEDICINE CLINIC | Facility: CLINIC | Age: 36
End: 2021-12-28

## 2021-12-28 NOTE — TELEPHONE ENCOUNTER
Pt is calling and would like to refill her    VYVANSE 60mg    Please send to [facility] in [address]     Pt states that she is completely out of the medication

## 2021-12-28 NOTE — TELEPHONE ENCOUNTER
To MD:  The above refill request is for a controlled substance. Please review pended medication order. Print and sign for staff to fax to pharmacy or prescribe electronically.     Last office visit: 12/14/21  Last time refill sent and quantity/refills: 1

## 2021-12-30 ENCOUNTER — TELEPHONE (OUTPATIENT)
Dept: INTERNAL MEDICINE CLINIC | Facility: CLINIC | Age: 36
End: 2021-12-30

## 2021-12-30 RX ORDER — FEXOFENADINE HYDROCHLORIDE AND PSEUDOEPHEDRINE HYDROCHLORIDE 60; 120 MG/1; MG/1
TABLET, FILM COATED, EXTENDED RELEASE ORAL
Qty: 60 TABLET | Refills: 5 | Status: SHIPPED | OUTPATIENT
Start: 2021-12-30

## 2021-12-30 NOTE — TELEPHONE ENCOUNTER
To MD:  The above refill request is for a controlled substance. Please review pended medication order. Print and sign for staff to fax to pharmacy or prescribe electronically.     Last office visit: 12/14/2021  Last time refill sent and quantity/refills:

## 2022-01-03 ENCOUNTER — OFFICE VISIT (OUTPATIENT)
Dept: OBGYN CLINIC | Facility: CLINIC | Age: 37
End: 2022-01-03
Payer: COMMERCIAL

## 2022-01-03 VITALS
WEIGHT: 236.19 LBS | DIASTOLIC BLOOD PRESSURE: 82 MMHG | SYSTOLIC BLOOD PRESSURE: 118 MMHG | BODY MASS INDEX: 34 KG/M2 | HEART RATE: 79 BPM

## 2022-01-03 DIAGNOSIS — Z01.419 ENCOUNTER FOR GYNECOLOGICAL EXAMINATION WITHOUT ABNORMAL FINDING: Primary | ICD-10-CM

## 2022-01-03 DIAGNOSIS — N92.6 IRREGULAR MENSES: ICD-10-CM

## 2022-01-03 PROCEDURE — 99395 PREV VISIT EST AGE 18-39: CPT | Performed by: OBSTETRICS & GYNECOLOGY

## 2022-01-03 PROCEDURE — 3074F SYST BP LT 130 MM HG: CPT | Performed by: OBSTETRICS & GYNECOLOGY

## 2022-01-03 PROCEDURE — 3079F DIAST BP 80-89 MM HG: CPT | Performed by: OBSTETRICS & GYNECOLOGY

## 2022-01-03 NOTE — PROGRESS NOTES
Lilian Martinez is a 31year old female [facility] Patient's last menstrual period was 12/20/2021.   Patient presents with:  Gyn Exam: ANNUAL EXAM   .     Her cycles are not regular- she states that they are coming monthly but the interval between cycles can va Acet-Ethinyl Est 1-20 MG-MCG Oral Tab, Take 1 tablet by mouth daily. , Disp: 28 tablet, Rfl: 0  •  buPROPion (WELLBUTRIN XL) 150 MG Oral Tablet 24 Hr, Take 1 tablet (150 mg total) by mouth every morning., Disp: 90 tablet, Rfl: 3  •  Fexofenadine-Pseudoephed symmetric, no thyromegaly, no nodules, no adenopathy  Lymphatic:no abnormal supraclavicular or axillary adenopathy is noted  Breast: normal without palpable masses, tenderness, asymmetry, nipple discharge, nipple retraction or skin changes  Respiratory:  l

## 2022-01-04 RX ORDER — NORETHINDRONE ACETATE AND ETHINYL ESTRADIOL 1MG-20(21)
1 KIT ORAL DAILY
Qty: 84 TABLET | Refills: 3 | Status: SHIPPED | OUTPATIENT
Start: 2022-01-04 | End: 2022-12-05

## 2022-01-31 ENCOUNTER — TELEPHONE (OUTPATIENT)
Dept: INTERNAL MEDICINE CLINIC | Facility: CLINIC | Age: 37
End: 2022-01-31

## 2022-01-31 RX ORDER — LISDEXAMFETAMINE DIMESYLATE CAPSULES 60 MG/1
60 CAPSULE ORAL DAILY
Qty: 30 CAPSULE | Refills: 0 | Status: SHIPPED | OUTPATIENT
Start: 2022-01-31 | End: 2023-01-19

## 2022-01-31 NOTE — TELEPHONE ENCOUNTER
To MD:  The above refill request is for a controlled substance. Please review pended medication order. Print and sign for staff to fax to pharmacy or prescribe electronically. Last office visit:12/14/21  Last time refill sent and quantity/refills: 12/28/21 #30  Per [place]  last dispensed 12/28/21    To Dr. Zeus White to please see note below and advise, thanks!

## 2022-01-31 NOTE — TELEPHONE ENCOUNTER
Pt requesting refill for Vyvanse  Pt insurance will end today  Can RX please be sent today?   Pt uses [facility] as [facility]  Tasked to [place]

## 2022-03-07 ENCOUNTER — TELEPHONE (OUTPATIENT)
Dept: INTERNAL MEDICINE CLINIC | Facility: CLINIC | Age: 37
End: 2022-03-07

## 2022-03-07 NOTE — TELEPHONE ENCOUNTER
[facility] of [facility] as of [facility]  Apologizes for delay in her request - she was travelling for work & is back now  Also [facility] [place], chart has been updated

## 2022-03-08 NOTE — TELEPHONE ENCOUNTER
Checked I   Last OV was 12/14/2021  Last script written 1/31/2022 #30 no RF  Dispensed as written  Please advise, thank you

## 2022-03-09 NOTE — TELEPHONE ENCOUNTER
Pt called  Pharmacy needs verification for refill   Pt has recently changed insurance and pharmacy  Now has [employer] [facility] [place]   (Pt was using [facility])  Please call pharmacy to confirm as pt is out of her medication  Tasked to [employer]

## 2022-03-10 NOTE — TELEPHONE ENCOUNTER
Spoke to pharmacist at [address] [place] [address] in [facility]. Pharmacist reports that a prior Jonah Bravo is needed. They have faxed over PA.  To Yarelis/PA team

## 2022-03-14 NOTE — TELEPHONE ENCOUNTER
Pt called again for status  She has been out of medication for a week  Please call [facility] for Prior Authorization:  [phone]  ID#:  [id number]  Tasked to [employer]

## 2022-03-14 NOTE — TELEPHONE ENCOUNTER
[facility] ([facility] requesting PA Vyvanse 60 mg cap  ID [id number], [phone]  Placed in purple folder

## 2022-03-14 NOTE — TELEPHONE ENCOUNTER
Prior Authorization faxed to [phone]  Confirmation received. Placed in red folder with barcode.

## 2022-03-17 ENCOUNTER — TELEPHONE (OUTPATIENT)
Dept: INTERNAL MEDICINE CLINIC | Facility: CLINIC | Age: 37
End: 2022-03-17

## 2022-03-17 NOTE — TELEPHONE ENCOUNTER
Pt calling requesting  Dr. Amanda Madera  to fax over a Prior auth to  [facility] for Vyvanse 50mg. Pt stated that she has been out of her meds for 2 weeks and need them ASAP. [facility] I[facility] D.# [id number]  Group # [id number]  Fax# [phone].

## 2022-03-21 NOTE — TELEPHONE ENCOUNTER
Patient calling back again. Contacted [facility]. They have not received the prior authorization.   Please refax    Patient also verified fax number with [facility]  Fax #[phone]

## 2022-03-21 NOTE — TELEPHONE ENCOUNTER
Patient calling asking if we could refax prior authorization to [facility] that she spoke to them on 3/17 and they had not received request as of then. She called our office on 3/17 asking us to refax. Patient states has been out of medication for a few weeks and stating she is feel the effect of being off it.

## 2022-03-22 RX ORDER — BUPROPION HYDROCHLORIDE 150 MG/1
150 TABLET ORAL EVERY MORNING
Qty: 90 TABLET | Refills: 2 | Status: SHIPPED | OUTPATIENT
Start: 2022-03-22

## 2022-03-25 RX ORDER — NORETHINDRONE ACETATE AND ETHINYL ESTRADIOL 1MG-20(21)
1 KIT ORAL DAILY
Qty: 84 TABLET | Refills: 3 | Status: CANCELLED | OUTPATIENT
Start: 2022-03-25

## 2022-03-26 RX ORDER — ESCITALOPRAM OXALATE 20 MG/1
20 TABLET ORAL DAILY
Qty: 90 TABLET | Refills: 3 | OUTPATIENT
Start: 2022-03-26

## 2022-03-29 NOTE — TELEPHONE ENCOUNTER
Patient is calling she needs this script to be transferred to [facility]  She will no longer be using [facility]    Patient is low on medication and would this done today

## 2022-03-30 RX ORDER — ESCITALOPRAM OXALATE 20 MG/1
20 TABLET ORAL DAILY
Qty: 90 TABLET | Refills: 2 | Status: SHIPPED | OUTPATIENT
Start: 2022-03-30

## 2022-03-30 NOTE — TELEPHONE ENCOUNTER
Pt called to check status on Lexapro Rx   She is out of medication    [facility]  [facility] from [place] no longer has her prescription on file

## 2022-06-21 ENCOUNTER — TELEPHONE (OUTPATIENT)
Dept: INTERNAL MEDICINE CLINIC | Facility: CLINIC | Age: 37
End: 2022-06-21

## 2022-06-21 NOTE — TELEPHONE ENCOUNTER
Pt is calling and would like a refill for the following:    VYVANSE 60 MG     Pt states she only has 3 pills left.     Pt states she would like 3 separate refill orders put in the system for future refills please

## 2022-06-22 NOTE — TELEPHONE ENCOUNTER
To MD:  The above refill request is for a controlled substance. Please review pended medication order. Print and sign for staff to fax to pharmacy or prescribe electronically.     Last office visit: 12/14/21  Last time refill sent and quantity/refills: 5/9/22 #30

## 2022-09-26 ENCOUNTER — TELEPHONE (OUTPATIENT)
Dept: INTERNAL MEDICINE CLINIC | Facility: CLINIC | Age: 37
End: 2022-09-26

## 2022-09-26 NOTE — TELEPHONE ENCOUNTER
Out of refills on Vyvanse  Has one capsule left for tomorrow  Please send new prescriptions to [facility] [place]

## 2022-09-27 NOTE — TELEPHONE ENCOUNTER
To MD:  The above refill request is for a controlled substance. Please review pended medication order. Print and sign for staff to fax to pharmacy or prescribe electronically.     Last office visit: 12/14/21  Last time refill sent and quantity/refills: 6/22/22 90 day panel   Per Hanh Syed  last dispensed 8/21/22

## 2022-11-22 ENCOUNTER — TELEPHONE (OUTPATIENT)
Dept: OBGYN CLINIC | Facility: CLINIC | Age: 37
End: 2022-11-22

## 2022-11-22 NOTE — TELEPHONE ENCOUNTER
Patient notes hard lump under the skin on right labia x 2 months. Comes and goes. It is currently very tender. Pt notified to avoid handling or squeezing. Keep clean and dry. Can apply warm compresses to help with drainage. Scheduled 11/30/22 with [facility]. Patient verbalized understanding.

## 2022-11-28 RX ORDER — NORETHINDRONE ACETATE AND ETHINYL ESTRADIOL AND FERROUS FUMARATE 1MG-20(21)
KIT ORAL
Qty: 28 TABLET | Refills: 8 | OUTPATIENT
Start: 2022-11-28

## 2022-12-05 ENCOUNTER — TELEPHONE (OUTPATIENT)
Dept: OBGYN CLINIC | Facility: CLINIC | Age: 37
End: 2022-12-05

## 2022-12-05 RX ORDER — NORETHINDRONE ACETATE AND ETHINYL ESTRADIOL 1MG-20(21)
1 KIT ORAL DAILY
Qty: 84 TABLET | Refills: 0 | Status: SHIPPED | OUTPATIENT
Start: 2022-12-05

## 2022-12-05 NOTE — TELEPHONE ENCOUNTER
Patient is currently out of birth control. Last annual was 1/3/2022. Scheduled for next annual on 2/1/2023. Please advise.

## 2022-12-28 ENCOUNTER — TELEPHONE (OUTPATIENT)
Dept: INTERNAL MEDICINE CLINIC | Facility: CLINIC | Age: 37
End: 2022-12-28

## 2022-12-28 DIAGNOSIS — E55.9 VITAMIN D DEFICIENCY: ICD-10-CM

## 2022-12-28 DIAGNOSIS — Z00.00 ROUTINE HEALTH MAINTENANCE: ICD-10-CM

## 2022-12-28 NOTE — TELEPHONE ENCOUNTER
Vyvanse refill pended to Dr. Tasha Lopez for review. Per [place]-, this was last dispensed on 11/27/22 for #30.

## 2022-12-28 NOTE — TELEPHONE ENCOUNTER
eRx sent x 1 month. Pt is due for annual PE and labs (ordered). Please ask her to schedule. Last seen in 12/2021. Maximilian her name is pronounced Ramy. You're welcome.

## 2023-01-18 RX ORDER — NORETHINDRONE ACETATE AND ETHINYL ESTRADIOL AND FERROUS FUMARATE 1MG-20(21)
KIT ORAL
Qty: 28 TABLET | Refills: 2 | OUTPATIENT
Start: 2023-01-18

## 2023-01-19 PROBLEM — F41.9 ANXIETY AND DEPRESSION: Status: ACTIVE | Noted: 2018-07-24

## 2023-01-19 PROBLEM — F32.A ANXIETY AND DEPRESSION: Status: ACTIVE | Noted: 2018-07-24

## 2023-02-01 ENCOUNTER — OFFICE VISIT (OUTPATIENT)
Dept: OBGYN CLINIC | Facility: CLINIC | Age: 38
End: 2023-02-01
Payer: COMMERCIAL

## 2023-02-01 VITALS
SYSTOLIC BLOOD PRESSURE: 128 MMHG | BODY MASS INDEX: 32.59 KG/M2 | HEART RATE: 80 BPM | DIASTOLIC BLOOD PRESSURE: 85 MMHG | WEIGHT: 227.63 LBS | HEIGHT: 70 IN

## 2023-02-01 DIAGNOSIS — Z01.419 ENCOUNTER FOR GYNECOLOGICAL EXAMINATION WITHOUT ABNORMAL FINDING: ICD-10-CM

## 2023-02-01 DIAGNOSIS — Z12.4 SCREENING FOR MALIGNANT NEOPLASM OF CERVIX: Primary | ICD-10-CM

## 2023-02-01 PROCEDURE — 3008F BODY MASS INDEX DOCD: CPT | Performed by: OBSTETRICS & GYNECOLOGY

## 2023-02-01 PROCEDURE — 3074F SYST BP LT 130 MM HG: CPT | Performed by: OBSTETRICS & GYNECOLOGY

## 2023-02-01 PROCEDURE — 3079F DIAST BP 80-89 MM HG: CPT | Performed by: OBSTETRICS & GYNECOLOGY

## 2023-02-01 PROCEDURE — 99395 PREV VISIT EST AGE 18-39: CPT | Performed by: OBSTETRICS & GYNECOLOGY

## 2023-02-01 RX ORDER — NORETHINDRONE ACETATE AND ETHINYL ESTRADIOL 1MG-20(21)
1 KIT ORAL DAILY
Qty: 84 TABLET | Refills: 3 | Status: SHIPPED | OUTPATIENT
Start: 2023-02-01

## 2023-02-02 LAB — HPV I/H RISK 1 DNA SPEC QL NAA+PROBE: NEGATIVE

## 2023-05-08 ENCOUNTER — TELEPHONE (OUTPATIENT)
Dept: INTERNAL MEDICINE CLINIC | Facility: CLINIC | Age: 38
End: 2023-05-08

## 2023-05-08 NOTE — TELEPHONE ENCOUNTER
To Dr. Enedina Grande--    The above refill request is for a controlled substance. Please review pended medication order.        lov 1/19/23  Prescribed: 3/23 #30   4/7 #30

## 2023-07-27 ENCOUNTER — PATIENT MESSAGE (OUTPATIENT)
Dept: INTERNAL MEDICINE CLINIC | Facility: CLINIC | Age: 38
End: 2023-07-27

## 2023-07-27 DIAGNOSIS — Z00.00 ROUTINE HEALTH MAINTENANCE: Primary | ICD-10-CM

## 2023-07-28 NOTE — TELEPHONE ENCOUNTER
From: Nadia Oneal  To: Marivel Hawthorne MD  Sent: 7/27/2023 4:28 PM CDT  Subject: Due for Blood work / biometric screening     Hi Doctor Regine Rivas! I am aware that I never completed the blood work I was supposed to get after my last physical. Also, I have a biometric screening coming up that I need to complete. I would like to get this done at the same time. Can you please put in an order so that I can come get that done? I have attached my biometric screening paperwork that I have to send in, in case you would need to see that. Thank you!    Erlinda Cherry

## 2023-07-28 NOTE — TELEPHONE ENCOUNTER
Eve response sent to patient  Awaiting her response about signature on form  And patient also needs to do blood work

## 2023-08-09 ENCOUNTER — LAB ENCOUNTER (OUTPATIENT)
Dept: LAB | Age: 38
End: 2023-08-09
Attending: INTERNAL MEDICINE
Payer: COMMERCIAL

## 2023-08-09 DIAGNOSIS — E55.9 VITAMIN D DEFICIENCY: ICD-10-CM

## 2023-08-09 DIAGNOSIS — Z00.00 ROUTINE HEALTH MAINTENANCE: ICD-10-CM

## 2023-08-09 LAB
ALBUMIN SERPL-MCNC: 3.8 G/DL (ref 3.4–5)
ALBUMIN/GLOB SERPL: 1.1 {RATIO} (ref 1–2)
ALP LIVER SERPL-CCNC: 56 U/L
ALT SERPL-CCNC: 42 U/L
ANION GAP SERPL CALC-SCNC: 7 MMOL/L (ref 0–18)
AST SERPL-CCNC: 26 U/L (ref 15–37)
BASOPHILS # BLD AUTO: 0.11 X10(3) UL (ref 0–0.2)
BASOPHILS NFR BLD AUTO: 1.8 %
BILIRUB SERPL-MCNC: 0.2 MG/DL (ref 0.1–2)
BUN BLD-MCNC: 16 MG/DL (ref 7–18)
BUN/CREAT SERPL: 19 (ref 10–20)
CALCIUM BLD-MCNC: 9.3 MG/DL (ref 8.5–10.1)
CHLORIDE SERPL-SCNC: 110 MMOL/L (ref 98–112)
CHOLEST SERPL-MCNC: 195 MG/DL (ref ?–200)
CO2 SERPL-SCNC: 23 MMOL/L (ref 21–32)
CREAT BLD-MCNC: 0.84 MG/DL
DEPRECATED RDW RBC AUTO: 40.4 FL (ref 35.1–46.3)
EGFRCR SERPLBLD CKD-EPI 2021: 92 ML/MIN/1.73M2 (ref 60–?)
EOSINOPHIL # BLD AUTO: 0.25 X10(3) UL (ref 0–0.7)
EOSINOPHIL NFR BLD AUTO: 4.2 %
ERYTHROCYTE [DISTWIDTH] IN BLOOD BY AUTOMATED COUNT: 12 % (ref 11–15)
FASTING PATIENT LIPID ANSWER: YES
FASTING STATUS PATIENT QL REPORTED: YES
GLOBULIN PLAS-MCNC: 3.5 G/DL (ref 2.8–4.4)
GLUCOSE BLD-MCNC: 102 MG/DL (ref 70–99)
HCT VFR BLD AUTO: 43.7 %
HDLC SERPL-MCNC: 52 MG/DL (ref 40–59)
HGB BLD-MCNC: 14.4 G/DL
IMM GRANULOCYTES # BLD AUTO: 0.01 X10(3) UL (ref 0–1)
IMM GRANULOCYTES NFR BLD: 0.2 %
LDLC SERPL CALC-MCNC: 105 MG/DL (ref ?–100)
LYMPHOCYTES # BLD AUTO: 2.05 X10(3) UL (ref 1–4)
LYMPHOCYTES NFR BLD AUTO: 34.3 %
MCH RBC QN AUTO: 30.1 PG (ref 26–34)
MCHC RBC AUTO-ENTMCNC: 33 G/DL (ref 31–37)
MCV RBC AUTO: 91.4 FL
MONOCYTES # BLD AUTO: 0.54 X10(3) UL (ref 0.1–1)
MONOCYTES NFR BLD AUTO: 9 %
NEUTROPHILS # BLD AUTO: 3.02 X10 (3) UL (ref 1.5–7.7)
NEUTROPHILS # BLD AUTO: 3.02 X10(3) UL (ref 1.5–7.7)
NEUTROPHILS NFR BLD AUTO: 50.5 %
NONHDLC SERPL-MCNC: 143 MG/DL (ref ?–130)
OSMOLALITY SERPL CALC.SUM OF ELEC: 291 MOSM/KG (ref 275–295)
PLATELET # BLD AUTO: 337 10(3)UL (ref 150–450)
POTASSIUM SERPL-SCNC: 4.2 MMOL/L (ref 3.5–5.1)
PROT SERPL-MCNC: 7.3 G/DL (ref 6.4–8.2)
RBC # BLD AUTO: 4.78 X10(6)UL
SODIUM SERPL-SCNC: 140 MMOL/L (ref 136–145)
TRIGL SERPL-MCNC: 219 MG/DL (ref 30–149)
TSI SER-ACNC: 2.21 MIU/ML (ref 0.36–3.74)
VIT D+METAB SERPL-MCNC: 33.5 NG/ML (ref 30–100)
VLDLC SERPL CALC-MCNC: 37 MG/DL (ref 0–30)
WBC # BLD AUTO: 6 X10(3) UL (ref 4–11)

## 2023-08-09 PROCEDURE — 80061 LIPID PANEL: CPT

## 2023-08-09 PROCEDURE — 36415 COLL VENOUS BLD VENIPUNCTURE: CPT

## 2023-08-09 PROCEDURE — 84443 ASSAY THYROID STIM HORMONE: CPT | Performed by: INTERNAL MEDICINE

## 2023-08-09 PROCEDURE — 82306 VITAMIN D 25 HYDROXY: CPT

## 2023-08-09 PROCEDURE — 80053 COMPREHEN METABOLIC PANEL: CPT

## 2023-08-09 PROCEDURE — 85025 COMPLETE CBC W/AUTO DIFF WBC: CPT

## 2023-09-12 ENCOUNTER — PATIENT MESSAGE (OUTPATIENT)
Dept: INTERNAL MEDICINE CLINIC | Facility: CLINIC | Age: 38
End: 2023-09-12

## 2023-11-02 RX ORDER — LISDEXAMFETAMINE DIMESYLATE CAPSULES 60 MG/1
60 CAPSULE ORAL DAILY
Qty: 30 CAPSULE | Refills: 0 | Status: CANCELLED | OUTPATIENT
Start: 2024-01-03 | End: 2024-02-02

## 2023-11-02 RX ORDER — LISDEXAMFETAMINE DIMESYLATE CAPSULES 60 MG/1
60 CAPSULE ORAL DAILY
Qty: 30 CAPSULE | Refills: 0 | Status: CANCELLED | OUTPATIENT
Start: 2023-12-03 | End: 2024-01-02

## 2023-11-02 NOTE — TELEPHONE ENCOUNTER
Patient is calling for a refill for     VYVANSE    [facility] [address]    Patient states she is completely out of the medication

## 2023-11-02 NOTE — TELEPHONE ENCOUNTER
To Dr. Nini Akins--    The above refill request is for a controlled substance. Please review pended medication order.        Lov 1/19/23  Prescribed #30 10/10   not updated

## 2023-11-03 NOTE — TELEPHONE ENCOUNTER
[facility] [place] [facility]     Please send refill to [facility]   [address]    Pt is out of medication - requests refill is sent in from on call doctor

## 2023-11-06 RX ORDER — LISDEXAMFETAMINE DIMESYLATE CAPSULES 60 MG/1
60 CAPSULE ORAL DAILY
Qty: 30 CAPSULE | Refills: 0 | Status: SHIPPED | OUTPATIENT
Start: 2023-11-06

## 2023-11-06 NOTE — TELEPHONE ENCOUNTER
To MD:  The above refill request is for a controlled substance. Please review pended medication order. Print and sign for staff to fax to pharmacy or prescribe electronically. Last office visit: 1/19/23  Last time refill sent and quantity/refills:   Lisdexamfetamine Dimesylate     Dispensed Written Strength Quantity Refills Days Supply Provider Pharmacy   LISDEXAMFETAMINE 60 MG CAPSULE 09/13/2023 09/13/2023 60 MG 30 capsule  30 Argenis Ibarra MD [facility] .. VYVANSE 60 MG CAPSULE 08/09/2023 08/09/2023 60 MG 16 capsule  16 Argenis Ibarra MD [facility] [id number] [facility] - ...        To Dr DOMINGUEZ---see patients message below  I pended one 30 day script since we keep having to send to different pharmacies

## 2023-11-06 NOTE — TELEPHONE ENCOUNTER
Patient is calling back today to check the status of the refill request. Patient is frustrated that this was not done on Friday when requested. Patient states she is worried the pharmacy may be out now because it is still not done.

## 2023-11-06 NOTE — TELEPHONE ENCOUNTER
Refill sent. Please let pt know. Also please remind her that pts are asked to allow 3 business days for refills due to the volume of requests.   And I was not in the office on Friday

## 2023-11-13 ENCOUNTER — PATIENT MESSAGE (OUTPATIENT)
Dept: INTERNAL MEDICINE CLINIC | Facility: CLINIC | Age: 38
End: 2023-11-13

## 2023-11-15 NOTE — TELEPHONE ENCOUNTER
Pended medication awaiting MD review.      Patient requesting print out of medication to be taken 30mg twice daily =60 mg daily for 30 days    MD to review if in agreement, pt wanting print out of rx

## 2023-11-16 RX ORDER — LISDEXAMFETAMINE DIMESYLATE CAPSULES 30 MG/1
60 CAPSULE ORAL EVERY MORNING
Qty: 60 CAPSULE | Refills: 0 | Status: SHIPPED | OUTPATIENT
Start: 2023-12-16 | End: 2024-01-15

## 2023-11-16 RX ORDER — LISDEXAMFETAMINE DIMESYLATE CAPSULES 30 MG/1
60 CAPSULE ORAL EVERY MORNING
Qty: 60 CAPSULE | Refills: 0 | Status: SHIPPED | OUTPATIENT
Start: 2023-11-16 | End: 2023-12-16

## 2023-11-16 RX ORDER — LISDEXAMFETAMINE DIMESYLATE CAPSULES 30 MG/1
60 CAPSULE ORAL EVERY MORNING
Qty: 60 CAPSULE | Refills: 0 | Status: SHIPPED | OUTPATIENT
Start: 2024-01-16 | End: 2024-02-15

## 2023-11-17 NOTE — TELEPHONE ENCOUNTER
Rx'es printed. Please let pt know that I changed the sig to \"2 capsules once a day in the morning\"   Vyvanse should not be taken twice a day    In my outbox.   Pt to

## 2024-01-16 RX ORDER — NORETHINDRONE ACETATE AND ETHINYL ESTRADIOL AND FERROUS FUMARATE 1MG-20(21)
1 KIT ORAL DAILY
Qty: 28 TABLET | Refills: 11 | OUTPATIENT
Start: 2024-01-16

## 2024-01-16 NOTE — TELEPHONE ENCOUNTER
Last annual - 2/1/2023  Last pap - 2/1/2023, negative    No future annual scheduled.  Rx denied.  Message sent to pt reminding her to schedule annual first.

## 2024-01-17 ENCOUNTER — TELEPHONE (OUTPATIENT)
Dept: OBGYN CLINIC | Facility: CLINIC | Age: 39
End: 2024-01-17

## 2024-01-17 RX ORDER — NORETHINDRONE ACETATE AND ETHINYL ESTRADIOL 1MG-20(21)
1 KIT ORAL DAILY
Qty: 84 TABLET | Refills: 1 | Status: SHIPPED | OUTPATIENT
Start: 2024-01-17

## 2024-01-17 NOTE — TELEPHONE ENCOUNTER
Patient hoping to get refill of her birth control to bridge the gap until her annual on 4/18/2024. Please advise.

## 2024-01-17 NOTE — TELEPHONE ENCOUNTER
Last annual: 2/1/23  Next annual: 4/18/24  Refill of Norethin Ace-Eth Estrad-FE (AUROVELA FE 1/20) 1-20 MG-MCG Oral Tab sent to pt's preferred pharmacy per protocol to cover pt until her next annual exam. Pt verbalized understanding.

## 2024-01-25 RX ORDER — ESCITALOPRAM OXALATE 20 MG/1
20 TABLET ORAL DAILY
Qty: 90 TABLET | Refills: 0 | Status: SHIPPED | OUTPATIENT
Start: 2024-01-25

## 2024-01-25 NOTE — TELEPHONE ENCOUNTER
Refill request is for a maintenance medication and has met the criteria specified in the Ambulatory Medication Refill Standing Order for eligibility, visits, laboratory, alerts and was sent to the requested pharmacy.    Requested Prescriptions     Signed Prescriptions Disp Refills    escitalopram 20 MG Oral Tab 90 tablet 0     Sig: TAKE 1 TABLET BY MOUTH EVERY DAY     Authorizing Provider: MADELYN SAGE     Ordering User: GINA MARTINO

## 2024-03-19 ENCOUNTER — PATIENT MESSAGE (OUTPATIENT)
Dept: INTERNAL MEDICINE CLINIC | Facility: CLINIC | Age: 39
End: 2024-03-19

## 2024-03-20 NOTE — TELEPHONE ENCOUNTER
From: Horace Otero  To: Jamilah Moran  Sent: 3/19/2024 7:27 PM CDT  Subject: Recent medical issues    Hi Doctor Dean!    I’ve had two most likely unrelated symptoms bother me lately and I wanted to touch base with you.    1) I’ve long been very susceptible to food going down the wrong pipe so to say, but over the last year or so (and it’s continued to get more frequent recently), even the slightest tickle in my throat while I’m eating will lead me to choking and coughing so violently and not catching my breath that I start to turn blue. It happened 3 times in the last week. But even before that it’s been maybe once month maybe or two. Like I said, increasingly more frequent.    2) I was on a 9 hour plane ride recently, and for the first time ever my feet and ankles swoll up very bad. They were bulging out of my shoes and pants. I realize this happens to many, but I have been on long flights before and never had this problem before.    Thank you,  Horace Otero

## 2024-04-11 ENCOUNTER — TELEPHONE (OUTPATIENT)
Dept: OBGYN CLINIC | Facility: CLINIC | Age: 39
End: 2024-04-11

## 2024-04-11 ENCOUNTER — PATIENT MESSAGE (OUTPATIENT)
Dept: OBGYN CLINIC | Facility: CLINIC | Age: 39
End: 2024-04-11

## 2024-04-11 ENCOUNTER — LAB ENCOUNTER (OUTPATIENT)
Dept: LAB | Age: 39
End: 2024-04-11
Attending: OBSTETRICS & GYNECOLOGY
Payer: COMMERCIAL

## 2024-04-11 DIAGNOSIS — Z32.00 PREGNANCY EXAMINATION OR TEST, PREGNANCY UNCONFIRMED: Primary | ICD-10-CM

## 2024-04-11 DIAGNOSIS — Z32.00 PREGNANCY EXAMINATION OR TEST, PREGNANCY UNCONFIRMED: ICD-10-CM

## 2024-04-11 LAB — B-HCG SERPL-ACNC: ABNORMAL MIU/ML

## 2024-04-11 PROCEDURE — 84702 CHORIONIC GONADOTROPIN TEST: CPT

## 2024-04-11 PROCEDURE — 36415 COLL VENOUS BLD VENIPUNCTURE: CPT

## 2024-04-11 NOTE — TELEPHONE ENCOUNTER
Pt calling to report +HPT, and has sore breast.  Pt made aware  that we have both male and female providers that she will need to rotate with throughout her PN care. Pt informed that the doctor that is on-call the day that she delivers is the one that will deliver her. Pt agreed and verbalized understanding.    Pt states she has irregular cycles, had dark spotting the last two cycles. Pt requesting to take pregnancy test first to confirm the pregnancy and see how far long she might be. Pt expressed she is not sure if she will want to keep the pregnancy.    Pt advised an HCG quant can be ordered, and once it is reviewed by the doctor, she can let us know if she would like us to help her schedule OBN appt. Pt verbalized understanding.     HCG quant ordered. Lab hours and locations provided via .

## 2024-04-11 NOTE — TELEPHONE ENCOUNTER
Message to [facility] on-call to please review pt's mychart message and advise. Pt with +HPT. She is planning to have HCG quant done to confirm dating (hx irregular cycles). Pt takes:    60mg Vyvanase daily (pregnancy class C)  20 mg Lexapro daily (pregnancy class C)    Message to [facility] to please advise if pt should continue to take these medications, thank you.

## 2024-04-12 NOTE — TELEPHONE ENCOUNTER
Message to [facility] (on-call) to please see pt's MC and advise. Pt is f/up from yesterdays message if okay to take 60mg of Vyvanse and 20mg of Lexapro while pregnant (both class C).

## 2024-04-15 ENCOUNTER — TELEPHONE (OUTPATIENT)
Dept: OBGYN CLINIC | Facility: CLINIC | Age: 39
End: 2024-04-15

## 2024-04-15 NOTE — TELEPHONE ENCOUNTER
Pt called,  PT is pregnant. FDLP 3/15/24. PT is requesting 1st appointment to be with CP. Please call.

## 2024-04-15 NOTE — TELEPHONE ENCOUNTER
Pt calling regarding quant HCG she completed on . Pt became pregnant on OCP's. We discussed establishing care, pt states her and  are unsure if they want to continue pregnancy. Pt informed we do not provide  services, but RN willing to give information regarding facilities. Pt states her and  \"might\" keep the baby, would like to speak to her provider and \"not a stranger\". Pt informed she can keep appt on the  that she had for annual. Can discuss quants, need for f/u testing and discuss concerns. Pt states understanding. States asking for message to be routed to [facility] for order for ultrasound.     To [facility] on-call to please review and advise. Thank you.

## 2024-04-18 ENCOUNTER — OFFICE VISIT (OUTPATIENT)
Dept: OBGYN CLINIC | Facility: CLINIC | Age: 39
End: 2024-04-18
Payer: COMMERCIAL

## 2024-04-18 ENCOUNTER — TELEPHONE (OUTPATIENT)
Dept: OBGYN CLINIC | Facility: CLINIC | Age: 39
End: 2024-04-18

## 2024-04-18 VITALS
DIASTOLIC BLOOD PRESSURE: 84 MMHG | WEIGHT: 238.19 LBS | SYSTOLIC BLOOD PRESSURE: 130 MMHG | HEART RATE: 76 BPM | BODY MASS INDEX: 34 KG/M2

## 2024-04-18 DIAGNOSIS — Z34.91 UNCERTAIN DATES, ANTEPARTUM, FIRST TRIMESTER (HCC): ICD-10-CM

## 2024-04-18 DIAGNOSIS — Z32.01 PREGNANCY EXAMINATION OR TEST, POSITIVE RESULT (HCC): Primary | ICD-10-CM

## 2024-04-18 NOTE — PROGRESS NOTES
Horace Otero    1985       Chief Complaint   Patient presents with    Consult     UPT AT HOME POSITIVE WHILE ON OCP  LMP: 3/11/24    Patient is here with her  this morning to discuss her recent positive pregnancy test.  She called our office concerned due to the medications that she was taking.  Her beta-hCG on 2024 was 52,446.  She is currently taking the birth control pill but states that on a trip to  and March she took several pills late.  We discussed that the pill has a short half-life and taking several of them late could have allowed her to ovulate.  She states that the only 2 dates that she could have conceived would have been  or  of this year.  That would make her around 6 weeks or 9 weeks.  I suggested we get a dating ultrasound.  She is currently taking several medications and the only one that she had a real concern about was her Vyvanse.  She states she has severe symptoms and she would try to wean off but she is concerned that she will not be able to.  I reassured her that there are many patients that have to continue to their meds during pregnancy and I suggested that she sit down with the maternal-fetal medicine doctors to discuss because she is still concerned.  Her primary care is Dr. Mac Milton's and Dr. Negron's who suggested that she start trying to wean off and that she see a psychiatrist and has referred her to [facility].  They have discussed Wellbutrin as an alternative.  We discussed advanced maternal age and first trimester screening and all questions were answered.    Past Medical History:    Migraines       History reviewed. No pertinent surgical history.    Menstrual History:  OB History    Para Term  AB Living   0 0 0 0 0 0   SAB IAB Ectopic Multiple Live Births   0 0 0 0 0        Patient's last menstrual period was 2024 (approximate).         Birth control method:OCP     Pap Date: 23  Pap Result Notes:  PAP NEG.HPV NEG     PHYSICAL EXAM:       Constitutional: well developed, well nourished    Psychiatric:  Oriented to time, place, person and situation. Appropriate mood and affect        Horace was seen today for consult.    Diagnoses and all orders for this visit:    Pregnancy examination or test, positive result (HCC)    Drug exposure, gestational (HCC)      Refer to MFM regarding drug exposure and for FTS once dates are known- spoke eto MFM this am who suggested that she take \"drug holidays\" when not working and go to the lowest effective dose if possible.  She does not need to stop her meds as they have not been shown to cause fetal harm- will inform pt.    Dating US asap.

## 2024-04-18 NOTE — TELEPHONE ENCOUNTER
Please inform pt that I spoke with the [facility] specialist who states that she does not need to stop her vyvanse- recs were to try to get down to the lowest effective dose and to take a \"drug Holiday\"  if possible when she does not have to work or concentrate.  It has not been shown to cause fetal harm and may be more harmful for her to not take a drug that she needs,  The benefits of continuing it outweigh the risks.

## 2024-04-19 ENCOUNTER — HOSPITAL ENCOUNTER (OUTPATIENT)
Dept: ULTRASOUND IMAGING | Facility: HOSPITAL | Age: 39
Discharge: HOME | End: 2024-04-19
Attending: OBSTETRICS & GYNECOLOGY
Payer: COMMERCIAL

## 2024-04-19 DIAGNOSIS — Z34.91 UNCERTAIN DATES, ANTEPARTUM, FIRST TRIMESTER (HCC): ICD-10-CM

## 2024-04-19 PROCEDURE — 76817 TRANSVAGINAL US OBSTETRIC: CPT | Performed by: OBSTETRICS & GYNECOLOGY

## 2024-04-19 PROCEDURE — 76801 OB US < 14 WKS SINGLE FETUS: CPT | Performed by: OBSTETRICS & GYNECOLOGY

## 2024-04-23 ENCOUNTER — TELEPHONE (OUTPATIENT)
Age: 39
End: 2024-04-23

## 2024-04-23 ENCOUNTER — TELEPHONE (OUTPATIENT)
Dept: OBGYN CLINIC | Facility: CLINIC | Age: 39
End: 2024-04-23

## 2024-04-23 NOTE — TELEPHONE ENCOUNTER
Pt had dating US on 4/19, is dating 10w5d today, calling to schedule 1st appt with MD. Pt has OBN PC appt scheduled on 4/29, and express desires to have FTS.     Assisted pt in scheduling soonest NPN appt on 5/2/24 with GARRETT. Pt is requesting if she OBN PC appt can be scheduled sooner now that she knows how far along she is.     Message to Clinical Supervisor - no sooner OBN appt available to offer pt. Please review and advise if able to schedule OBN PC appt sooner? Thank you.

## 2024-04-23 NOTE — TELEPHONE ENCOUNTER
Per patient needs to set up visit, states the nurse will understand more and discuss with them. Please advise patient has OBN scheduled 4/29

## 2024-04-24 NOTE — TELEPHONE ENCOUNTER
LMTCB    Please offer pt OBN PC appt for 2pm today (4/24/24). If pt unable to accept appt for today then keep appt that is scheduled for Monday 4/29/24. Thank you!

## 2024-04-26 ENCOUNTER — PATIENT MESSAGE (OUTPATIENT)
Dept: INTERNAL MEDICINE CLINIC | Facility: CLINIC | Age: 39
End: 2024-04-26

## 2024-04-26 NOTE — TELEPHONE ENCOUNTER
From: Horace Otero  To: Jamilah Moran  Sent: 4/26/2024 10:57 AM CDT  Subject: New Vyvanse dosage    Hi Doctor Dean, happy Friday!    I wanted to reach out and let you know that I left a message for you with the office for a new Vyvanse prescription because I have 2 days left, and bc I am requesting a new dosage of 50mg, wanted to give you a little background.     1. Doctor Poole checked with the maternity medicine specialists at the hospital, and they have advised that I stay on Vyvanse for my pregnancy. However, they have advised that I try to wean myself to the lowest dosage that will work, and to take “drug holidays” when I can get away with not taking it.     2. I wanted you to know that the psychiatrist [facility] that you sent a referral for me did reach out. They however cannot see me until May 15. I will meet with them and do want a second opinion on how best to treat my adhd during pregnancy. However, I would like to try going down to the 50mg Vyvanse in the meantime.     3. Side note: the maternity medicine specialists also advised (as did doctor page) that I stay on my 20mg lexapro for my pregnancy. I will discuss this with the psychiatry nurse practitioner that I have an appointment with on May 15 as well.     4. If possible, I would like to  the prescription as a paper prescription as I have been doing. I am not 100% sure that that got relayed when I spoke with the nurse today.    Thank you for all of your help, support and guidance at this time. I cannot tell you how much I truly appreciate it.     AP

## 2024-04-29 ENCOUNTER — LAB ENCOUNTER (OUTPATIENT)
Dept: LAB | Facility: HOSPITAL | Age: 39
End: 2024-04-29
Attending: OBSTETRICS & GYNECOLOGY
Payer: COMMERCIAL

## 2024-04-29 ENCOUNTER — TELEPHONE (OUTPATIENT)
Dept: OBGYN CLINIC | Facility: CLINIC | Age: 39
End: 2024-04-29

## 2024-04-29 ENCOUNTER — NURSE ONLY (OUTPATIENT)
Dept: OBGYN CLINIC | Facility: CLINIC | Age: 39
End: 2024-04-29
Payer: COMMERCIAL

## 2024-04-29 ENCOUNTER — PATIENT MESSAGE (OUTPATIENT)
Dept: OBGYN CLINIC | Facility: CLINIC | Age: 39
End: 2024-04-29

## 2024-04-29 DIAGNOSIS — Z34.01 ENCOUNTER FOR SUPERVISION OF NORMAL FIRST PREGNANCY IN FIRST TRIMESTER (HCC): ICD-10-CM

## 2024-04-29 DIAGNOSIS — O09.521 AMA (ADVANCED MATERNAL AGE) MULTIGRAVIDA 35+, FIRST TRIMESTER (HCC): Primary | ICD-10-CM

## 2024-04-29 DIAGNOSIS — Z34.01 ENCOUNTER FOR SUPERVISION OF NORMAL FIRST PREGNANCY IN FIRST TRIMESTER (HCC): Primary | ICD-10-CM

## 2024-04-29 PROBLEM — D25.9 UTERINE FIBROID DURING PREGNANCY, ANTEPARTUM (HCC): Status: ACTIVE | Noted: 2024-04-29

## 2024-04-29 PROBLEM — O34.10 UTERINE FIBROID DURING PREGNANCY, ANTEPARTUM (HCC): Status: ACTIVE | Noted: 2024-04-29

## 2024-04-29 LAB
ANTIBODY SCREEN: NEGATIVE
BASOPHILS # BLD AUTO: 0.1 X10(3) UL (ref 0–0.2)
BASOPHILS NFR BLD AUTO: 0.9 %
DEPRECATED RDW RBC AUTO: 40.4 FL (ref 35.1–46.3)
EOSINOPHIL # BLD AUTO: 0.18 X10(3) UL (ref 0–0.7)
EOSINOPHIL NFR BLD AUTO: 1.7 %
ERYTHROCYTE [DISTWIDTH] IN BLOOD BY AUTOMATED COUNT: 12.1 % (ref 11–15)
GLUCOSE 1H P GLC SERPL-MCNC: 98 MG/DL
HBV SURFACE AG SER-ACNC: <0.1 [IU]/L
HBV SURFACE AG SERPL QL IA: NONREACTIVE
HCT VFR BLD AUTO: 39.5 %
HCV AB SERPL QL IA: NONREACTIVE
HGB BLD-MCNC: 13.7 G/DL
IMM GRANULOCYTES # BLD AUTO: 0.05 X10(3) UL (ref 0–1)
IMM GRANULOCYTES NFR BLD: 0.5 %
LYMPHOCYTES # BLD AUTO: 3.09 X10(3) UL (ref 1–4)
LYMPHOCYTES NFR BLD AUTO: 29.2 %
MCH RBC QN AUTO: 31.6 PG (ref 26–34)
MCHC RBC AUTO-ENTMCNC: 34.7 G/DL (ref 31–37)
MCV RBC AUTO: 91 FL
MONOCYTES # BLD AUTO: 0.61 X10(3) UL (ref 0.1–1)
MONOCYTES NFR BLD AUTO: 5.8 %
NEUTROPHILS # BLD AUTO: 6.57 X10 (3) UL (ref 1.5–7.7)
NEUTROPHILS # BLD AUTO: 6.57 X10(3) UL (ref 1.5–7.7)
NEUTROPHILS NFR BLD AUTO: 61.9 %
PLATELET # BLD AUTO: 319 10(3)UL (ref 150–450)
RBC # BLD AUTO: 4.34 X10(6)UL
RH BLOOD TYPE: POSITIVE
RUBV IGG SER QL: POSITIVE
RUBV IGG SER-ACNC: 91.4 IU/ML (ref 10–?)
T PALLIDUM AB SER QL IA: NONREACTIVE
WBC # BLD AUTO: 10.6 X10(3) UL (ref 4–11)

## 2024-04-29 PROCEDURE — 86803 HEPATITIS C AB TEST: CPT

## 2024-04-29 PROCEDURE — 86900 BLOOD TYPING SEROLOGIC ABO: CPT

## 2024-04-29 PROCEDURE — 87340 HEPATITIS B SURFACE AG IA: CPT

## 2024-04-29 PROCEDURE — 86901 BLOOD TYPING SEROLOGIC RH(D): CPT

## 2024-04-29 PROCEDURE — 36415 COLL VENOUS BLD VENIPUNCTURE: CPT

## 2024-04-29 PROCEDURE — 87389 HIV-1 AG W/HIV-1&-2 AB AG IA: CPT

## 2024-04-29 PROCEDURE — 87086 URINE CULTURE/COLONY COUNT: CPT

## 2024-04-29 PROCEDURE — 82950 GLUCOSE TEST: CPT

## 2024-04-29 PROCEDURE — 86850 RBC ANTIBODY SCREEN: CPT

## 2024-04-29 PROCEDURE — 85025 COMPLETE CBC W/AUTO DIFF WBC: CPT

## 2024-04-29 PROCEDURE — 86787 VARICELLA-ZOSTER ANTIBODY: CPT

## 2024-04-29 PROCEDURE — 86762 RUBELLA ANTIBODY: CPT

## 2024-04-29 PROCEDURE — 86780 TREPONEMA PALLIDUM: CPT

## 2024-04-29 RX ORDER — CHOLECALCIFEROL (VITAMIN D3) 25 MCG
1 TABLET,CHEWABLE ORAL DAILY
COMMUNITY

## 2024-04-29 NOTE — TELEPHONE ENCOUNTER
11w4d. Pt seen for OBN appt today with no complaints. Pt previously saw [facility] for +HPT while on OCP. Please see 4/18/24 visit notes. Pt has new OB scheduled with GARRETT on 5/2/24.    Per pt, she spoke with [facility] about FTS at 4/18 office visit. Pt is requesting order for FTS prior to new OB appt, as she is already 11w4d and wants to get this completed asap (pt is AMA).    To [facility], on-call to please advise if ok to order prior to new OB, thank you.

## 2024-04-29 NOTE — PROGRESS NOTES
Pt seen for OBN appt today with no complaints. Pt previously saw [facility] for +HPT while on OCP. Please see 4/18 visit notes. Normal PN labs ordered plus varicella and 1 hr gtt. Pt advised all labs must be completed and resulted prior to NPN appt. If labs are not completed and resulted the NPN appt will be cancelled. Pt informed again of both male and female providers and the need to rotate PN appt with all providers since OB on-call will be the one that delivers her. Assisted pt with scheduling NPN appt with MD. Per pt, she spoke with [facility] about FTS at 4/18 office visit. TE sent to [facility], on-call as pt is currently 11w4d and desires FTS.      Height: 5'10''  Weight: 236 lbs  BMI: 33.9    Partner's name is Deandre contact #[phone]; race:   Pt's occupation:     MEDICAL HISTORY    Anemia No    Anesthetic complications No    Anxiety/Depression  Yes Pt currently taking 60mg Vyvance daily for ADHD and 20mg Lexapro daily for anxiety. Pt has psychiatry appt scheduled for 5/15 to further discuss dosing of medication.   Autoimmune Disorder Yes Alopecia   Asthma  No    Cancer No    Diabetes  No    Gyne/breast Surgery No    Heart Disease No    Hepatitis/Liver Disease  No    History of blood transfusion No    History of abnormal pap No    Hypertension  No    Infertility  No    Kidney Disease/Frequent UTIs  Yes Frequent UTI's with ring BC   Medication Allergies No    Latex Allergies No    Food Allergies  No    Neurological Disorder/Epilepsy No Pt with recurrent (4x) episodes of \"blacking out\" while exercising- pt reports she would be able to hear but not see anything. This happened 4 times, while she was between the ages [age]    Operations/Hospitalizations Yes Eye surgery when 7-7 y/o   TB exposure  No    Thyroid Dysfunction No    Trauma/Violence  No    Uterine Anomaly  No    Uterine Fibroids  Yes Hx ovarian cysts, fibroids per 4/19/24 US report   Variocosities/DVTs No    Smoker No    Drug usage in prior  year Yes Edibles occasionally prior to pregnancy. Pt informed of potential drug screening during pregnancy.   Alcohol Yes Before pt found out she was pregnant   Would you accept a blood transfusion? If no, are you a Scientology? Yes                INFECTION HISTORY    Chlamydia No    Pt or partner have hx of Genital Herpes No    Gonorrhea No    Hepatitis B No    HIV No    HPV No    MRSA No    Syphilis No    Tattoos No    Live with someone or Exposed to TB No    Rash or viral illness since LMP  No    Varicella Yes Chicken pox as a child   Pets Yes 1 dog         GENETICS SCREENING    Genetic Screening    Genetic Screening/Teratology Counseling- Includes patient, baby's father, or anyone in either family with:  Patient's age 35 years or older as of estimated date of delivery: Yes     Thalassemia (Italian, Greek, Mediterranean, or  background): MCV less than 80: No     Neural tube defect (Meningomyelocele, Spina bifida, or Anencephaly): No     Congenital heart defect: No     Down syndrome: No     Erick-Sachs (Ashkenazi Temple, Cajun, Kosovan Flora): No     Canavan disease (Ashkenazi Temple): No     Familial dysautonomia (Ashkenazi Temple): No     Sickle cell disease or trait (): No     Hemophilia or other blood disorders: Yes (Comment: Pt's mom is susceptible to blood clots)     Muscular dystrophy: No    Cystic fibrosis: No     Betsy's chorea: No     Intellectual disability and/or autism: Yes (Comment: ADHD on maternal side)     If yes, was the person tested for Fragile X?: No     Other inherited genetic or chromosomal disorder: No     Maternal metabolic disorder (eg. Type 1 diabetes, PKU): No     Patient or baby's father had child with birth defects not listed above: No     Recurrent pregnancy loss, or a stillbirth: No     Medications (including supplements, vitamins, herbs, or OTC drugs)/illicit/recreational drugs/alcohol since last menstrual period: Yes (Comment: 60mg Vyvance daily, 20mg  Lexapro daily, pt stopped taking OCP's on 4/15, pnv)                 MISC    Infant vaccinations  Yes    Pt. Answered YES to any 5P questions and/or  risk factors are present. Pt did not know she was pregnant prior to drinking alcohol.    Pt. Education was provided on OUD and pregnancy. Pt declines referral to [facility] at this time.

## 2024-04-30 ENCOUNTER — PATIENT MESSAGE (OUTPATIENT)
Dept: OBGYN CLINIC | Facility: CLINIC | Age: 39
End: 2024-04-30

## 2024-04-30 NOTE — TELEPHONE ENCOUNTER
From: Horace Otero  To: Bonita Poole  Sent: 2024 11:14 AM CDT  Subject: Testing question     Ozzie Poole,  I just got the message from Natividad SINGH from maternal fetal medicine confirming that I can make the appt for the genetic screening.    I called to make the appt, and was surprised that it was for another ultrasound and not any additional blood work (was told that after the ultrasound I could request additional bloodwork), but that was not my understanding from what I have discussed with you and the nurses I have spoken with thus far.    Didn’t you just want me to do blood work for the genetic screening?    Can you please review what I have done so far, and what I have scheduled (first pre-dwayne appt with ultrasound scheduled for May 2) and confirm what tests I need done and ordered for the genetic testing?    Everyone seems to have different answers to thinfs so far and I am hoping you can provide some clarity.    Thank you so much,    Horace Otero

## 2024-05-01 LAB — VZV IGG SER IA-ACNC: 630.1 (ref 165–?)

## 2024-05-02 ENCOUNTER — PATIENT MESSAGE (OUTPATIENT)
Dept: OBGYN CLINIC | Facility: CLINIC | Age: 39
End: 2024-05-02

## 2024-05-02 ENCOUNTER — INITIAL PRENATAL (OUTPATIENT)
Dept: OBGYN CLINIC | Facility: CLINIC | Age: 39
End: 2024-05-02
Payer: COMMERCIAL

## 2024-05-02 VITALS
BODY MASS INDEX: 34 KG/M2 | SYSTOLIC BLOOD PRESSURE: 121 MMHG | HEART RATE: 91 BPM | WEIGHT: 236 LBS | DIASTOLIC BLOOD PRESSURE: 79 MMHG

## 2024-05-02 DIAGNOSIS — Z34.01 ENCOUNTER FOR SUPERVISION OF NORMAL FIRST PREGNANCY IN FIRST TRIMESTER (HCC): Primary | ICD-10-CM

## 2024-05-02 LAB
APPEARANCE: CLEAR
GLUCOSE (URINE DIPSTICK): NEGATIVE MG/DL
KETONES (URINE DIPSTICK): NEGATIVE MG/DL
MULTISTIX LOT#: NORMAL NUMERIC
NITRITE, URINE: NEGATIVE
PROTEIN (URINE DIPSTICK): NEGATIVE MG/DL
URINE-COLOR: YELLOW

## 2024-05-02 PROCEDURE — 81002 URINALYSIS NONAUTO W/O SCOPE: CPT | Performed by: OBSTETRICS & GYNECOLOGY

## 2024-05-03 LAB
C TRACH DNA SPEC QL NAA+PROBE: NEGATIVE
N GONORRHOEA DNA SPEC QL NAA+PROBE: NEGATIVE
T VAGINALIS RRNA SPEC QL NAA+PROBE: NEGATIVE

## 2024-05-03 NOTE — TELEPHONE ENCOUNTER
To Dr. Perkins, please review and advise. Thank you. Office notes for New Prenatal are not completed at this time.

## 2024-05-03 NOTE — TELEPHONE ENCOUNTER
From: Horace Otero  To: Apryl Perkins  Sent: 5/2/2024 6:34 PM CDT  Subject: [facility]S Test    Hello Doctor Gregorio,    My  and I have a few more questions we would like to ask about the [facility]S test before we move forward:    1. Are there any other side effects to me or the baby other than the 1/100 chance of miscarriage?   2. How long does it take to get the results?  3. If a miscarriage were to happen due to the CVS, what time frame of a miscarriage is most common?   4. Will insurance cover any of the cost of the [facility]S?  5. How common are these CVS procedures, and how many/ how often are these done at [facility]?    Thank you so very much for your time and patience with us.     Horace Otero

## 2024-05-03 NOTE — TELEPHONE ENCOUNTER
M specialist does CVS -- do not know coding & what her insurance will cover -- no SE to her or baby except concern on miscarriage. Cannot tell her time frame that miscarriage would occur but if due to procedure than relatively soon thereafter -- can present as no heart beat or as PPROM.

## 2024-05-05 ENCOUNTER — TELEPHONE (OUTPATIENT)
Dept: OBGYN CLINIC | Facility: CLINIC | Age: 39
End: 2024-05-05

## 2024-05-05 DIAGNOSIS — O09.521 AMA (ADVANCED MATERNAL AGE) MULTIGRAVIDA 35+, FIRST TRIMESTER (HCC): Primary | ICD-10-CM

## 2024-05-05 DIAGNOSIS — D21.9 FIBROIDS: ICD-10-CM

## 2024-05-05 PROBLEM — O99.340 ANXIETY DISORDER AFFECTING PREGNANCY, ANTEPARTUM (HCC): Status: ACTIVE | Noted: 2024-05-05

## 2024-05-05 PROBLEM — F41.9 ANXIETY DISORDER AFFECTING PREGNANCY, ANTEPARTUM (HCC): Status: ACTIVE | Noted: 2024-05-05

## 2024-05-05 PROBLEM — O99.210 OBESITY AFFECTING PREGNANCY, ANTEPARTUM (HCC): Status: ACTIVE | Noted: 2024-05-05

## 2024-05-05 PROBLEM — O09.511 PRIMIGRAVIDA OF ADVANCED MATERNAL AGE IN FIRST TRIMESTER (HCC): Status: ACTIVE | Noted: 2024-05-05

## 2024-05-05 NOTE — TELEPHONE ENCOUNTER
Pt wishes / needs the following. Please do referral & send MFM order for:    [  ]  FTS -- alrady scheduled 5/10/24    DX:  AMA, obesity, large fibroids    [  ]  medical consult    [  ]  16 wk cervical length    [ x ]  level 2 ultrasound    [   ] fetal echo    [ x ]  growth ultrasound at 32 wks    [  ]  serial ultrasound    [ x ]  NSTs once 36 weeks

## 2024-05-05 NOTE — PROGRESS NOTES
Neg /neg 2/23. Gc/Chl/Trich done. Unplanned pregnancy -- conceived while on ocps. Reveiwed FTS vs CVS vs amnio in detail. May wish for CVS or amnio. Has appt for FTS on 5/10/24 -- needs to let us know prior if wishes for other procedure to change order. Has ultrasound 5 yrs ago w/ RPW & never told had fibroids. Dating ultrasound w/ 2 large fibroids (7.4 cm & 7.2 cm).

## 2024-05-06 NOTE — TELEPHONE ENCOUNTER
Maternal Fetal Medicine calling for updated order instead of 1st Trimester Screen.     Maternal Fetal Medicine needs order changed to CVS and consult.     To referrals to assist.

## 2024-05-08 ENCOUNTER — HOSPITAL ENCOUNTER (OUTPATIENT)
Dept: PERINATAL CARE | Facility: HOSPITAL | Age: 39
Discharge: HOME | End: 2024-05-08
Attending: OBSTETRICS & GYNECOLOGY

## 2024-05-08 ENCOUNTER — HOSPITAL ENCOUNTER (OUTPATIENT)
Dept: PERINATAL CARE | Facility: HOSPITAL | Age: 39
Discharge: HOME | End: 2024-05-08
Attending: OBSTETRICS & GYNECOLOGY
Payer: COMMERCIAL

## 2024-05-08 VITALS
DIASTOLIC BLOOD PRESSURE: 86 MMHG | WEIGHT: 236 LBS | HEART RATE: 84 BPM | BODY MASS INDEX: 34 KG/M2 | SYSTOLIC BLOOD PRESSURE: 128 MMHG

## 2024-05-08 DIAGNOSIS — O09.521 AMA (ADVANCED MATERNAL AGE) MULTIGRAVIDA 35+, FIRST TRIMESTER (HCC): ICD-10-CM

## 2024-05-08 DIAGNOSIS — O09.511 PRIMIGRAVIDA OF ADVANCED MATERNAL AGE IN FIRST TRIMESTER (HCC): ICD-10-CM

## 2024-05-08 DIAGNOSIS — O09.511 PRIMIGRAVIDA OF ADVANCED MATERNAL AGE IN FIRST TRIMESTER (HCC): Primary | ICD-10-CM

## 2024-05-08 DIAGNOSIS — D21.9 FIBROIDS: ICD-10-CM

## 2024-05-08 DIAGNOSIS — E66.09 OTHER OBESITY DUE TO EXCESS CALORIES AFFECTING PREGNANCY, ANTEPARTUM (HCC): ICD-10-CM

## 2024-05-08 DIAGNOSIS — O99.210 OTHER OBESITY DUE TO EXCESS CALORIES AFFECTING PREGNANCY, ANTEPARTUM (HCC): ICD-10-CM

## 2024-05-08 PROCEDURE — 76813 OB US NUCHAL MEAS 1 GEST: CPT | Performed by: OBSTETRICS & GYNECOLOGY

## 2024-05-08 NOTE — PROGRESS NOTES
Reason for Consult:   Dear Dr. Perkins,    Thank you for requesting ultrasound evaluation and maternal fetal medicine consultation on Horace Otero.  As you are aware she is a 38 year old female with a Kauffman pregnancy at 12w6d.  A maternal-fetal medicine consultation was requested secondary to  AMA and  [facility].  Her prenatal records and labs were reviewed.    Review of History:     OB History:    OB History    Para Term  AB Living   1 0 0 0 0 0   SAB IAB Ectopic Multiple Live Births   0 0 0 0 0      # Outcome Date GA Lbr Danie/2nd Weight Sex Type Anes PTL Lv   1 Current                      Allergies:  No Known Allergies   Current Meds:  Current Outpatient Medications   Medication Sig Dispense Refill    lisdexamfetamine (VYVANSE) 50 MG Oral Cap Take 1 capsule (50 mg total) by mouth every morning. 30 capsule 0    prenatal vitamin with DHA 27-0.8-228 MG Oral Cap Take 1 capsule by mouth daily.      escitalopram 20 MG Oral Tab Take 1 tablet (20 mg total) by mouth daily. 90 tablet 2    albuterol 108 (90 Base) MCG/ACT Inhalation Aero Soln Inhale 1 puff into the lungs every 6 (six) hours as needed for Shortness of Breath. 1 each 1        HISTORY:  Past Medical History:    ADHD    Alopecia    Anxiety    Depression    Migraines      Past Surgical History:   Procedure Laterality Date    Eye surgery        Family History   Problem Relation Age of Onset    Hypertension Mother     Other (Other) Mother         Parkinson's/ Hepatitis from needle stick in     Diabetes Paternal Grandmother     Psychiatric Paternal Grandfather         Bipolar disorder, schizophrenia r/t PTSD related to  service    Blood Disorder Sister     Psoriasis Sister     Asthma Sister     Diabetes Other     Breast Cancer Other       Social History     Socioeconomic History    Marital status: Single   Tobacco Use    Smoking status: Never    Smokeless tobacco: Never   Vaping Use    Vaping status: Never Used   Substance and Sexual  Activity    Alcohol use: Not Currently     Comment: 2-3 drinks weekly    Drug use: No    Sexual activity: Yes     Birth control/protection: OCP   Other Topics Concern    Caffeine Concern Yes     Comment: Coffee 1 cup daily     Social Determinants of Health     Financial Resource Strain: Low Risk  (4/29/2024)    Financial Resource Strain     Difficulty of Paying Living Expenses: Not hard at all     Med Affordability: No   Food Insecurity: No Food Insecurity (4/29/2024)    Food Insecurity     Food Insecurity: Never true   Transportation Needs: No Transportation Needs (4/29/2024)    Transportation Needs     Lack of Transportation: No   Stress: No Stress Concern Present (4/29/2024)    Stress     Feeling of Stress : No   Housing Stability: Low Risk  (4/29/2024)    Housing Stability     Housing Instability: No        NARRATIVE:   /86   Pulse 84   Wt 236 lb (107 kg)   LMP 03/15/2024   BMI 33.86 kg/m²            Alert and Oriented.  No acute distress          Abdomen:  soft, nontender, no contractions noted.           extremities:  nontender, no edema           DISCUSSION  During her visit we discussed and reviewed the following issues:  ADVANCED MATERNAL AGE    Background  I reviewed with the patient that pregnancies in women of advanced maternal age (35 or older at delivery) are associated with elevated risks. Specifically, there is a higher rate of:  Fetal malformations  Preeclampsia  Gestational diabetes  Intrauterine fetal death    As a result, enhanced pregnancy surveillance is advised for these patients including a comprehensive ultrasound to assess for fetal malformations (at 20 weeks) and a third trimester ultrasound assessment for fetal growth (at 32 weeks). In addition, weekly NST's (initiating at 36 weeks gestation for women 35-39 years and at 32 weeks gestation for women 40 years and older) are also advised. Routine obstetric care is more than adequate to assess for gestational diabetes and  preeclampsia; hence, no further significant alterations in obstetric care are advised.    Medical Complications    Women 35 years of age or older can expect to experience two to three fold higher rates of hospitalization,  delivery, and pregnancy-related complications when compared to their younger counterparts.  The two most common medical problems complicating these  pregnanccies are hypertension and diabetes.   The incidence of preeclampsia in the general obstetric population is 3 to 4 percent; this increases to 5 to 10 percent in women over age 40 and is as high as 35 percent in women over age 50.   The incidence of gestational diabetes in the general obstetric population is 3 percent, rising to 7 to 12 percent in women over age 40 and 20 percent in women over age 50.  Women 35 years of age or older are more likely to be delivered by . The  delivery rate in the general obstetric population of the [place] is almost 30 percent, compared to almost 50 percent in women over age 40 to 45 and almost 80 percent in women age 50 to 63.          Fetal Death        A decision analysis tool using data from the [place] [facility]  Database predicted a strategy of weekly antepartum testing and labor induction would lower the risk of unexplained fetal death in women 35 years of age or older. In this model, weekly testing starting at 36 weeks of gestation would drop the risk of fetal death from 5.2 to 1.3 per 1000 pregnancies. While a policy of antepartum testing in older women does increase the chance that a women will be induced (71 inductions per fetal death averted) and thereby increases her risk of having a  delivery, only 14 additional cesareans would need to be performed to avert one unexplained fetal death.  Hence, weekly NST's are advised for women of advanced maternal age; testing should be initiated at 36 weeks for women 35-39 years and at 32 weeks for women 40 years and  older.    Fetal Malformations    Cardiac malformations, clubfoot, and diaphragmatic hernia appear to occur with increased frequency in offspring of older women. These abnormalities are structural and unrelated to aneuploidy, thus they would not be detected by karyotype analysis.  For these reasons a complete, detailed ultrasound (level II) is advised even if the fetus has a normal karyotype.      Fetal Aneuploidy      Invasive Testing  I offered invasive genetic testing (amniocentesis, chorionic villus sampling) after reviewing the diagnostic accuracy of these tests as well as the procedure associated loss rate (1:500 for genetic amniocentesis).        We discussed  the increased risk of chromosomal abnormalities associated with advanced maternal age at age 39. She understands that ultrasound exam cannot exclude potential genetic abnormalities.  Her estimated risk based on maternal age at amniocentesis with any chromosome abnormality is about 1: 80 and with Down Syndrome is about 1: 150.   We also discussed the risks and benefits of having  genetic testing ([facility] and amniocentesis) performed.      Non-invasive Pregnancy Testing (NIPT)  I reviewed current non-invasive screening options. Currently non-invasive pregnancy testing (NIPT) offers the highest detection rate (with the lowest false positive rate) for the detection of fetal aneuploidy amongst high-risk patients. The limitations of detailed mid-trimester sonography was reviewed with the patient. First trimester screening and second trimester multiple-marker serum serum screening as alternative aneuploidy screening options were also reviewed. However, both of these tests are associated with lower detection and higher false positive rates.    ---------  OBESITY:  Obesity during pregnancy is associated with numerous maternal and  risks.  It is not clear whether obesity is a direct cause of adverse pregnancy outcome or whether the association between obesity  and adverse pregnancy outcome is due to factors such as diabetes mellitus.   Data suggest that obese women should be encouraged to undertake a weight reduction program (diet, exercise, behavior modification, and possibly bariatric surgery in some cases) prior to attempting to conceive.            Subfertility in obese women is most commonly related to ovulatory dysfunction, and, in some obese women, the ovulatory dysfunction is related to polycystic ovary syndrome (PCOS). It is also important to note that even among ovulatory women, increasing obesity is associated with decreasing spontaneous pregnancy rates.  The increased risk of miscarriage in obese women may be because such women often have PCOS or isolated insulin resistance.                 Due to its strong association with obesity in the general population, type 2 diabetes mellitus is one of the two most common medical complications of the obese . The increased risk of type 2 diabetes is primarily related to an exaggerated increase in insulin resistance in the obese state. It is reasonable to screen obese gravidas for undiagnosed pregestational diabetes in the first trimester.   Glucose intolerance associated with gestational diabetes generally resolves postpartum; however, obese women with a history of gestational diabetes have a two-fold increased prevalence of subsequent type 2 diabetes.           An association between obesity and hypertensive disorders during pregnancy has been consistently reported.  In particular, maternal weight and BMI are independent risk factors for preeclampsia.             Studies have found that the increased risk of  birth in obese gravidas is primarily associated with obesity-related medical and  complications, rather than an intrinsic predisposition to spontaneous  birth. Prevention of  birth in these patients, therefore, should be directed toward prevention or management of medical and  obstetrical complications.               Both prepregnancy obesity and excessive maternal weight gain before or during pregnancy contribute to an increased probability of  delivery.  It has also been hypothesized that obesity may lead to dystocia due to increased soft tissue deposition in the maternal pelvis.    delivery in the obese  is associated with numerous perioperative concerns, including emergency delivery, prolonged incision to delivery interval, blood loss >1000 mL, longer operative times, wound infection, thromboembolism, and endometritis.            Maternal obesity appears to be associated with a small increase in the absolute rate of some congenital anomalies, and the risk may increase with increasing maternal weight.  The risk of neural tube defects increased significantly with maternal weight.    The analysis found that overweight and obese pregnant women experienced significantly more stillbirths than normal weight women.      Increase  testing and Level 2 Ultrasound is recommended.         BMI 30-34  Early 1 hr gtt  11-20 lb weight gain for pregnancy  Level II ultrasound at 20 weeks at the discretion of the OB provider  Growth US & BPP at 32 weeks  Weekly NSTs at 36 weeks    ----------      FIBROIDS:  I informed her of the fibroids.  Fibroids can increase in size during pregnancy and could lead to abdominal pain with an increased risk of  labor.  Most cases are unremarkable.  Fibroids can obstruct the pelvis and increase the risk for a . We discussed the morbidity and mortality associated with prematurity at various gestational ages.  The signs and symptoms of  labor were discussed.        OB ULTRASOUND REPORT   See imaging tab for complete ultrasound report     Fetal Heart Rate: Present 174 bpm  Fetal Presentation: Transverse Left  Amniotic fluid MVP: WNL  Placental Location: Posterior       FIRST TRIMESTER SCREENING:    The CRL is consistent  with the gestational age.  The nuchal translucency measures  1.7   mm. This is within normal limits.  The nasal bone is present.  Fetus: arms and legs seen suboptimally. Fetal head/skull and abdomen appeared normal. Stomach and bladder seen.   Uterus and adnexa appeared normal       Chorionic Villus Sampling (CVS).    We discussed about potential risks associated with advanced maternal age and genetic testing.  We compared and contrasted the different methods of genetic testing.  The benefits and disadvantages of each were discussed.  With amniocentesis the risk of pregnancy loss is about 1:1000 at 15wks or greater.  Amniocentesis <14wks is not recommended.  Amniocentesis has less of a chance of mosaicism and AFAFP  acetylcholinesterase can be evaluated.        With CVS, genetic results can be obtained earlier. It can be performed between 10-12 wks' gestation. It is not recommnded prior to the 10th weeks' gestation due to the risks of limb defects. The risk of pregnancy loss is about 1:500.  We discussed the possibility of placental mosaicism (1.3%).  An ultrasound will be performed prior to the procedure to determine whether transcervical or transabdominal approach is used.   She wished to proceed with [facility] and informed consent was obtained.     Her abdomen was prepped and draped in the usual fashion. Under ultrasound guidance a 20 gauge spinal needle was introduced through the abdominal wall into the mass of the placenta.  A 20 cc syringe was attached and negative pressure applied.  The needle was moved back and forth 4-5 times within the placenta with continuous aspiration then withdrawn.  FHT's were visualized before and after the procedure.  The sample appears adequate.      She was given verbal and written post CVS instructions.  There was no apparent complications. The results should be available within 2 wks.     IMPRESSION:   1. IUP @  12w6d  2. Scan consistent with dates  3. No fetal structural  abnormalities seen but limited by early gestational age  4.  AMA  5.  Fibroids  6.   Obesity BMI 33  RECOMMENDATIONS:   1.  Weekly NST at 36wks  2.  Growth US at 32wks  3.  Level 2 US at 20wks  4.  Await [facility] results.    Thank you for allowing me to participate in the care of your patient.  Please do not hesitate to call with any questions or concerns.     Total time spent   45  minutes this calendar day which includes preparing to see the patient including chart review, obtaining and/or reviewing additional medical history, performing a physical exam and evaluation, documenting clinical information in the electronic medical record, independently interpreting results, counseling the patient, communicating results to the patient/family/caregiver and coordinating care.    Alvaro Singletary D.O.  Maternal Fetal Medicine     Note to patient and family:  The 21st Century Cures Act makes medical notes available to patients in the interest of transparency.  However, please be advised that this is a medical document.  It is intended as a peer to peer communication.  It is written in medical language and may contain abbreviations or verbiage that are technical and unfamiliar.  It may appear blunt or direct.  Medical documents are intended to carry relevant information, facts as evident, and the clinical opinion of the practitioner.

## 2024-05-08 NOTE — PROGRESS NOTES
Pt for Diagnostic [facility]  Pre and post procedure instructions discussed handout given  Consent signed  Labs drawn per [facility] genetics request

## 2024-05-13 ENCOUNTER — TELEPHONE (OUTPATIENT)
Dept: PERINATAL CARE | Facility: HOSPITAL | Age: 39
End: 2024-05-13

## 2024-05-13 NOTE — TELEPHONE ENCOUNTER
Pt informed of [facility] FISH results normal. Pt still considering microarray and will call back with decision

## 2024-05-17 PROBLEM — Z91.89 AT RISK FOR POSTPARTUM DEPRESSION: Status: ACTIVE | Noted: 2024-05-17

## 2024-05-24 ENCOUNTER — TELEPHONE (OUTPATIENT)
Dept: PERINATAL CARE | Facility: HOSPITAL | Age: 39
End: 2024-05-24

## 2024-05-24 NOTE — TELEPHONE ENCOUNTER
[facility] Genetics results reviewed by Dr Hager     The Chromosome analysis results are  46XX    Normal  female        Karyotype    Results scanned into pt record    Pt contacted with above

## 2024-05-30 DIAGNOSIS — F98.8 ATTENTION DEFICIT DISORDER (ADD) WITHOUT HYPERACTIVITY: ICD-10-CM

## 2024-05-30 RX ORDER — LISDEXAMFETAMINE DIMESYLATE 50 MG/1
50 CAPSULE ORAL EVERY MORNING
Qty: 30 CAPSULE | Refills: 0 | Status: SHIPPED | OUTPATIENT
Start: 2024-05-30

## 2024-05-30 NOTE — TELEPHONE ENCOUNTER
Patient states she will be out of the medication in 3 days    Patient would like to pick the script up in the office, please call when ready for  [phone]

## 2024-05-30 NOTE — TELEPHONE ENCOUNTER
To MD:  The above refill request is for a controlled substance.  Please review pended medication order.   Print and sign for staff to fax to pharmacy or prescribe electronically.    Last office visit: 1/31/24  Last time refill sent and quantity/refills: 4/30/24 #30/0 (dose lowered due to pregnancy)    Spoke with patient. She did see a psychiatrist through [facility]. Psychiatrist stated that pt should stay on current dose of Vyvanse 50 mg. Psychiatrist felt it's best to have PCP continue to fill, since no further changes made to rx. In addition, psych office doesn't provide paper scripts, which patient needs due to shortage at pharmacies. Pt takes prescription to which ever pharmacy has rx in stock.   Patient will continue to see psychiatrist monthly.       Medication Dispense History (from 12/2/2023 to 5/30/2024)  Expand All  Collapse All  Lisdexamfetamine Dimesylate     Dispensed Written Strength Quantity Refills Days Supply Provider Pharmacy   [facility] 04/30/2024 04/30/2024 50 mg 30  30 [facility], [facility] #[id number]   [facility] 03/28/2024 01/31/2024 30 mg 60  30 [facility], [facility] # [id number]   [facility] 02/24/2024 01/31/2024 30 mg 60  30 [facility], [facility] 01/19/2024 11/16/2023 30 mg 60  30 [facility], [facility] #[id number]   [facility] 12/19/2023 12/15/2023 30 mg 60  30 [facility], [facility]/PHARMACY #[id number]

## 2024-06-03 ENCOUNTER — ROUTINE PRENATAL (OUTPATIENT)
Dept: OBGYN CLINIC | Facility: CLINIC | Age: 39
End: 2024-06-03

## 2024-06-03 VITALS
BODY MASS INDEX: 35 KG/M2 | HEART RATE: 80 BPM | DIASTOLIC BLOOD PRESSURE: 78 MMHG | WEIGHT: 244.63 LBS | SYSTOLIC BLOOD PRESSURE: 115 MMHG

## 2024-06-03 DIAGNOSIS — Z34.91 ENCOUNTER FOR SUPERVISION OF NORMAL PREGNANCY IN FIRST TRIMESTER, UNSPECIFIED GRAVIDITY (HCC): Primary | ICD-10-CM

## 2024-06-17 ENCOUNTER — PATIENT MESSAGE (OUTPATIENT)
Dept: OBGYN CLINIC | Facility: CLINIC | Age: 39
End: 2024-06-17

## 2024-06-17 NOTE — TELEPHONE ENCOUNTER
From: Horace Otero  To: Bonita WOODALL Page  Sent: 6/17/2024 2:53 PM CDT  Subject: Travel note request for Air Travel    Hi Doctor Page,    My  and I have a trip to [place] coming up and I have read that it can be beneficial to have a doctors note to confirm how many weeks along you are and if you are approved for air travel.    My next appointment is with [facility] for my 20 week scan on July 2. I also do have an OB appointment with dr. Velasco on July 9.     Are you able to write this note for me? If so, my travel details are below.     I will be going July 10 - July 29. And will have 2 flights in between then while in [place].    I will be 22 weeks on July 11 (flying on July 10)  I will be 23 weeks on July 18 (flying July 19)  I will be 23 weeks when I fly on July 23/ July 24  I will be 24 weeks on July 25 (flying home on July 29)    Thank you!   Horace Otero

## 2024-06-28 DIAGNOSIS — F98.8 ATTENTION DEFICIT DISORDER (ADD) WITHOUT HYPERACTIVITY: ICD-10-CM

## 2024-06-28 NOTE — TELEPHONE ENCOUNTER
Patient called requesting written prescription for Vyvanse, she will be out of medication by Monday     Patient is requesting to pick it up today

## 2024-06-28 NOTE — TELEPHONE ENCOUNTER
3 month follow up for malignant neoplasm of endometrium ,  pt reports no abnormal spotting or bleeding, pt states no questions or concerns for today's visit    1. Have you been to the ER, urgent care clinic since your last visit? Hospitalized since your last visit?  no    2. Have you seen or consulted any other health care providers outside of the [address] [place] [address] since your last visit? Include any pap smears or colon screening.    no To MD:  The above refill request is for a controlled substance.  Please review pended medication order.   Print and sign for staff to fax to pharmacy or prescribe electronically.    Last office visit: 01/31/2024  Last time refill sent and quantity/refills: 5/30/2024 qt y30 plus 0           Dispensed Written Strength Quantity Refills Days Supply Provider Pharmacy   LISDEXAMFETAMINE 50 MG CAPSULE 06/02/2024 05/30/2024  30 each  30 Jamilah Moran MD [facility]/pharmacy #[id number] - [place]...   VYVANSE 50 MG CAPSULE 04/30/2024 04/30/2024  30 each  30 Jamilah Moran MD [facility]/pharmacy #[id number] - [place]...   VYVANSE 30 MG CAPSULE 03/28/2024 01/31/2024  60 each  30 Jamilah Moran MD [facility]/pharmacy #[id number] - C...   VYVANSE 30 MG CAPSULE 02/24/2024 01/31/2024  60 each  30 Jamilah Moran MD [facility]/pharmacy #[id number] - C...

## 2024-07-01 RX ORDER — LISDEXAMFETAMINE DIMESYLATE 50 MG/1
50 CAPSULE ORAL EVERY MORNING
Qty: 30 CAPSULE | Refills: 0 | Status: SHIPPED | OUTPATIENT
Start: 2024-07-01

## 2024-07-02 ENCOUNTER — OFFICE VISIT (OUTPATIENT)
Dept: INTERNAL MEDICINE CLINIC | Facility: CLINIC | Age: 39
End: 2024-07-02

## 2024-07-02 ENCOUNTER — HOSPITAL ENCOUNTER (OUTPATIENT)
Dept: PERINATAL CARE | Facility: HOSPITAL | Age: 39
Discharge: HOME | End: 2024-07-02
Attending: OBSTETRICS & GYNECOLOGY
Payer: COMMERCIAL

## 2024-07-02 VITALS
HEART RATE: 76 BPM | SYSTOLIC BLOOD PRESSURE: 126 MMHG | OXYGEN SATURATION: 98 % | DIASTOLIC BLOOD PRESSURE: 80 MMHG | HEIGHT: 70 IN | TEMPERATURE: 99 F | WEIGHT: 251 LBS | BODY MASS INDEX: 35.93 KG/M2

## 2024-07-02 VITALS
SYSTOLIC BLOOD PRESSURE: 118 MMHG | WEIGHT: 251 LBS | HEART RATE: 91 BPM | BODY MASS INDEX: 36 KG/M2 | DIASTOLIC BLOOD PRESSURE: 77 MMHG

## 2024-07-02 DIAGNOSIS — O35.9XX0 FETAL ABNORMALITY AFFECTING MANAGEMENT OF MOTHER, SINGLE OR UNSPECIFIED FETUS (HCC): ICD-10-CM

## 2024-07-02 DIAGNOSIS — F90.0 ATTENTION DEFICIT HYPERACTIVITY DISORDER (ADHD), PREDOMINANTLY INATTENTIVE TYPE: ICD-10-CM

## 2024-07-02 DIAGNOSIS — E66.09 OTHER OBESITY DUE TO EXCESS CALORIES AFFECTING PREGNANCY, ANTEPARTUM (HCC): ICD-10-CM

## 2024-07-02 DIAGNOSIS — D25.9 UTERINE FIBROIDS AFFECTING PREGNANCY IN SECOND TRIMESTER (HCC): Primary | ICD-10-CM

## 2024-07-02 DIAGNOSIS — O34.10 UTERINE FIBROID DURING PREGNANCY, ANTEPARTUM (HCC): ICD-10-CM

## 2024-07-02 DIAGNOSIS — O09.512 AMA (ADVANCED MATERNAL AGE) PRIMIGRAVIDA 35+, SECOND TRIMESTER (HCC): ICD-10-CM

## 2024-07-02 DIAGNOSIS — O34.12 UTERINE FIBROIDS AFFECTING PREGNANCY IN SECOND TRIMESTER (HCC): Primary | ICD-10-CM

## 2024-07-02 DIAGNOSIS — J38.5 LARYNGOSPASM: ICD-10-CM

## 2024-07-02 DIAGNOSIS — R13.10 DYSPHAGIA, UNSPECIFIED TYPE: Primary | ICD-10-CM

## 2024-07-02 DIAGNOSIS — O99.210 OTHER OBESITY DUE TO EXCESS CALORIES AFFECTING PREGNANCY, ANTEPARTUM (HCC): ICD-10-CM

## 2024-07-02 DIAGNOSIS — D25.9 UTERINE FIBROID DURING PREGNANCY, ANTEPARTUM (HCC): ICD-10-CM

## 2024-07-02 DIAGNOSIS — O99.212 OTHER OBESITY DUE TO EXCESS CALORIES AFFECTING PREGNANCY IN SECOND TRIMESTER (HCC): ICD-10-CM

## 2024-07-02 DIAGNOSIS — E66.09 OTHER OBESITY DUE TO EXCESS CALORIES AFFECTING PREGNANCY IN SECOND TRIMESTER (HCC): ICD-10-CM

## 2024-07-02 PROCEDURE — 76811 OB US DETAILED SNGL FETUS: CPT | Performed by: OBSTETRICS & GYNECOLOGY

## 2024-07-02 PROCEDURE — 99214 OFFICE O/P EST MOD 30 MIN: CPT | Performed by: INTERNAL MEDICINE

## 2024-07-02 RX ORDER — LISDEXAMFETAMINE DIMESYLATE 50 MG/1
50 CAPSULE ORAL DAILY
Qty: 30 CAPSULE | Refills: 0 | Status: SHIPPED | OUTPATIENT
Start: 2024-07-02 | End: 2024-08-01

## 2024-07-02 RX ORDER — LISDEXAMFETAMINE DIMESYLATE 50 MG/1
50 CAPSULE ORAL DAILY
Qty: 30 CAPSULE | Refills: 0 | Status: SHIPPED | OUTPATIENT
Start: 2024-09-02 | End: 2024-10-02

## 2024-07-02 RX ORDER — LISDEXAMFETAMINE DIMESYLATE 50 MG/1
50 CAPSULE ORAL DAILY
Qty: 30 CAPSULE | Refills: 0 | Status: SHIPPED | OUTPATIENT
Start: 2024-08-02 | End: 2024-09-01

## 2024-07-02 NOTE — PROGRESS NOTES
Horace Otero is a 39 year old female.  Chief Complaint   Patient presents with    Follow - Up     Patient here due to increasing choking while eating.      HPI:     Pt states she has always choked on food for years, since she was a kid, \"embarrassingly so.\"    States her throat gets irritated easily, eg by something small like rice, which then causes her to cough/choke violently. When the choking occurs, it then feels like her airway closes.  Lasts for at least one minute up to a few minutes.  States she has turned blue in the past.   Most recently it occurred with chewed up broccoli; she had to vomit.  Occurs more frequently when she is talking while eating though not consistently      20 weeks pregnant    Was told by OB she should continue her vyvanse and that risk of stopping outweighs any benefit.  Requests 90 day supply (she did not  the Rx from yesterday)      Current Outpatient Medications   Medication Sig Dispense Refill    prenatal vitamin with DHA 27-0.8-228 MG Oral Cap Take 1 capsule by mouth daily.      escitalopram 20 MG Oral Tab Take 1 tablet (20 mg total) by mouth daily. 90 tablet 2    albuterol 108 (90 Base) MCG/ACT Inhalation Aero Soln Inhale 1 puff into the lungs every 6 (six) hours as needed for Shortness of Breath. 1 each 1    lisdexamfetamine (VYVANSE) 50 MG Oral Cap Take 1 capsule (50 mg total) by mouth every morning. (Patient not taking: Reported on 7/2/2024) 30 capsule 0      Past Medical History:    ADHD    Alopecia    Anxiety    Depression    Migraines    Primigravida of advanced maternal age in first trimester (HCC)    Options of FTS, CVS, amnio, genetic counseling, Level 1 vs level 2 u/s reviewed -- has appt 5/10/24 but may want either CVS / amnio instead     If 35-38 y/o by EDC, then [  ] u/s at 32 wks [  ] weekly NST at 36  If over 39 y/o by EDC, then [  ] MFM serial growth u/s [  ]  BPP or NST post 32 wks [  ] delivery by 39-40          Uterine fibroid during pregnancy,  antepartum (HCC)    2 fibroids 7.4 and 7.2 cm-  20 week and growth US's with MFM        Social History:  Social History     Socioeconomic History    Marital status:    Tobacco Use    Smoking status: Never    Smokeless tobacco: Never   Vaping Use    Vaping status: Never Used   Substance and Sexual Activity    Alcohol use: Not Currently     Comment: 2-3 drinks weekly    Drug use: No    Sexual activity: Yes     Birth control/protection: OCP   Other Topics Concern    Caffeine Concern Yes     Comment: Coffee 1 cup daily     Social Determinants of Health     Financial Resource Strain: Low Risk  (4/29/2024)    Financial Resource Strain     Difficulty of Paying Living Expenses: Not hard at all     Med Affordability: No   Food Insecurity: No Food Insecurity (4/29/2024)    Food Insecurity     Food Insecurity: Never true   Transportation Needs: No Transportation Needs (4/29/2024)    Transportation Needs     Lack of Transportation: No   Stress: No Stress Concern Present (4/29/2024)    Stress     Feeling of Stress : No   Housing Stability: Low Risk  (4/29/2024)    Housing Stability     Housing Instability: No        REVIEW OF SYSTEMS:   GENERAL HEALTH: feels well otherwise  RESPIRATORY: no SOB  CARDIOVASCULAR: no chest pain/pressure  GI: no nausea, vomiting, diarrhea    Wt Readings from Last 5 Encounters:   07/02/24 251 lb (113.9 kg)   07/02/24 251 lb (113.9 kg)   06/03/24 244 lb 9.6 oz (110.9 kg)   05/08/24 236 lb (107 kg)   05/02/24 236 lb (107 kg)     Body mass index is 36.01 kg/m².      EXAM:   /80   Pulse 76   Temp 98.9 °F (37.2 °C)   Ht 5' 10\" (1.778 m)   Wt 251 lb (113.9 kg)   LMP 03/15/2024   SpO2 98%   BMI 36.01 kg/m²   GENERAL: well developed, well nourished, in no apparent distress  HEENT: normal oropharynx  NECK: supple, no adenopathy, no stridor  LUNGS: clear to auscultation  CARDIO: RRR, normal S1S2, without murmur or gallop      ASSESSMENT AND PLAN:     Laryngospasm  -does not sound like actual  dysphagia, but will ask speech to evaluate swallowing to r/o  -refer to ENT Dr. Henriquez -- laryngoscopy?  -meanwhile, discussed with pt the importance of eating slowing and not talking while eating    ADD  -RF'ed Vyvanse 50mg (3 mo supply) - Rx'es printed  -pt has okay'ed Rx with her OB/gyn    The patient indicates understanding of these issues and agrees to the plan.    Jamilah Moran MD, 07/02/24, 5:20 PM

## 2024-07-02 NOTE — PROGRESS NOTES
Reason for Consult:   Dear Dr. Perkins,    Thank you for requesting ultrasound evaluation and maternal fetal medicine consultation on Horace Otero.  As you are aware she is a 39 year old female with a Kauffman pregnancy with Estimated Date of Delivery: 24 .  A maternal-fetal medicine f/u is today..  Her prenatal records and labs were reviewed.    Feeling good.  Review of History:     OB History:    OB History    Para Term  AB Living   1 0 0 0 0 0   SAB IAB Ectopic Multiple Live Births   0 0 0 0 0      # Outcome Date GA Lbr Danie/2nd Weight Sex Type Anes PTL Lv   1 Current                      Allergies:  No Known Allergies   Current Meds:  Current Outpatient Medications   Medication Sig Dispense Refill    lisdexamfetamine (VYVANSE) 50 MG Oral Cap Take 1 capsule (50 mg total) by mouth every morning. 30 capsule 0    prenatal vitamin with DHA 27-0.8-228 MG Oral Cap Take 1 capsule by mouth daily.      escitalopram 20 MG Oral Tab Take 1 tablet (20 mg total) by mouth daily. 90 tablet 2    albuterol 108 (90 Base) MCG/ACT Inhalation Aero Soln Inhale 1 puff into the lungs every 6 (six) hours as needed for Shortness of Breath. 1 each 1        HISTORY:  Past Medical History:    ADHD    Alopecia    Anxiety    Depression    Migraines    Primigravida of advanced maternal age in first trimester (HCC)    Options of FTS, CVS, amnio, genetic counseling, Level 1 vs level 2 u/s reviewed -- has appt 5/10/24 but may want either CVS / amnio instead     If 35-38 y/o by EDC, then [  ] u/s at 32 wks [  ] weekly NST at 36  If over 39 y/o by EDC, then [  ] MFM serial growth u/s [  ]  BPP or NST post 32 wks [  ] delivery by 39-40          Uterine fibroid during pregnancy, antepartum (HCC)    2 fibroids 7.4 and 7.2 cm-  20 week and growth US's with MFM        Past Surgical History:   Procedure Laterality Date    Eye surgery        Family History   Problem Relation Age of Onset    Depression Mother     Anxiety Mother      Hypertension Mother     Other (Other) Mother         Parkinson's/ Hepatitis from needle stick in 80's    Diabetes Paternal Grandmother     Psychiatric Paternal Grandfather         Bipolar disorder, schizophrenia r/t PTSD related to  service    Blood Disorder Sister     Psoriasis Sister     Asthma Sister     Diabetes Other     Breast Cancer Other       Social History     Socioeconomic History    Marital status:    Tobacco Use    Smoking status: Never    Smokeless tobacco: Never   Vaping Use    Vaping status: Never Used   Substance and Sexual Activity    Alcohol use: Not Currently     Comment: 2-3 drinks weekly    Drug use: No    Sexual activity: Yes     Birth control/protection: OCP   Other Topics Concern    Caffeine Concern Yes     Comment: Coffee 1 cup daily     Social Determinants of Health     Financial Resource Strain: Low Risk  (4/29/2024)    Financial Resource Strain     Difficulty of Paying Living Expenses: Not hard at all     Med Affordability: No   Food Insecurity: No Food Insecurity (4/29/2024)    Food Insecurity     Food Insecurity: Never true   Transportation Needs: No Transportation Needs (4/29/2024)    Transportation Needs     Lack of Transportation: No   Stress: No Stress Concern Present (4/29/2024)    Stress     Feeling of Stress : No   Housing Stability: Low Risk  (4/29/2024)    Housing Stability     Housing Instability: No        NARRATIVE:   /77   Pulse 91   Wt 251 lb (113.9 kg)   LMP 03/15/2024   BMI 36.01 kg/m²            Alert and Oriented.  No acute distress          Abdomen:  soft, nontender, no contractions noted.                      DISCUSSION  During her visit we discussed and reviewed the following issues:  ADVANCED MATERNAL AGE    Background  I reviewed with the patient that pregnancies in women of advanced maternal age (35 or older at delivery) are associated with elevated risks. Specifically, there is a higher rate of:  Fetal  malformations  Preeclampsia  Gestational diabetes  Intrauterine fetal death   Please see previous [facility] detailed discussion.     ---------  OBESITY:   Please see previous [facility] detailed discussion.     ----------      FIBROIDS:  I informed her of the fibroids.  Fibroids can increase in size during pregnancy and could lead to abdominal pain with an increased risk of  labor.  Most cases are unremarkable.  Fibroids can obstruct the pelvis and increase the risk for a . We discussed the morbidity and mortality associated with prematurity at various gestational ages.  The signs and symptoms of  labor were discussed.        1.  Size 86 mm x 78 mm x 93 mm. Mean 85.7 mm. Vol 326.644 cm³. Left lateral  2.  Size 35 mm x 24 mm x 30 mm. Mean 29.7 mm. Vol 13.195 cm³. Anterior  3.  Size 24 mm x 16 mm x 25 mm. Mean 21.7 mm. Vol 5.027 cm³. Right     We also discussed the single umbilical artery (SUA).  SUA is not an uncommon finding and the incidence is reported to be between 0.5-6% depending on the population studied.  SUA has been associated with genitourinary and cardiac abnormalities.  The risk for aneuploidy is not increased in cases of isolated SUA, but is high in cases in which structural abnormalities are also found.  There is an increased risk for fetal growth restriction which is thought to be due to placental abnormality.  Deliveries are generally one week earlier in fetuses with SUA but the mean age at delivery is 38+ vs 39+ in babies with 3 vessel cords.   testing is recommended to help reduce the risk of stillbirth and I recommend weekly NST ~34 weeks.    A third trimester ultrasound is recommended to reevaluate fetal anatomy and assess growth.  If fetal growth restriction is detected, additional fetal monitoring is indicated.   If the 4 ch and great vessel views of the heart are normal, fetal echocardiogram is not necessary.      With her risk factors, SUA, and  and large fibroid with 2  smaller, I reocmmended growth US monthly in thrid trimester.    OB ULTRASOUND REPORT   See imaging tab for complete ultrasound report    Level II US with size equal to dates.  Ultrasound Findings:  Single IUP in breech presentation.    Placenta is right lateral.   A 2 vessel cord is noted with a possible velamentous cord insertion.    Cardiac activity is present at 151 bpm   g ( 0 lb 14 oz);   MVP is 7.5 cm .     The fetal measurements are consistent with the established EDC. The nasal bone is present.  She understands that ultrasound exam cannot exclude genetic abnormalities and that genetic testing is recommended. The limitations of ultrasound were discussed.     Uterus and adnexa appeared normal  today on US  IMPRESSION:   1. IUP @  20w5d   2. Scan consistent with dates  3. No fetal structural abnormalities seen but limited by early gestational age  4.  AMA--CVS 46XX  5.  Fibroids  6.   Obesity BMI 33  RECOMMENDATIONS:   1.  Weekly NST at 34wks  2.  Monthly Growth US in third trimester with ANN  at 32wks      Thank you for allowing me to participate in the care of your patient.  Please do not hesitate to call with any questions or concerns.     Total time spent   20   minutes this calendar day which includes preparing to see the patient including chart review, obtaining and/or reviewing additional medical history, performing a physical exam and evaluation, documenting clinical information in the electronic medical record, independently interpreting results, counseling the patient, communicating results to the patient/family/caregiver and coordinating care.    Ashley Christianson MD   Maternal Fetal Medicine     Note to patient and family:  The 21st Century Cures Act makes medical notes available to patients in the interest of transparency.  However, please be advised that this is a medical document.  It is intended as a peer to peer communication.  It is written in medical language and may contain abbreviations  or verbiage that are technical and unfamiliar.  It may appear blunt or direct.  Medical documents are intended to carry relevant information, facts as evident, and the clinical opinion of the practitioner.

## 2024-07-03 ENCOUNTER — TELEPHONE (OUTPATIENT)
Dept: PHYSICAL THERAPY | Facility: HOSPITAL | Age: 39
End: 2024-07-03

## 2024-07-03 ENCOUNTER — PATIENT MESSAGE (OUTPATIENT)
Dept: INTERNAL MEDICINE CLINIC | Facility: CLINIC | Age: 39
End: 2024-07-03

## 2024-07-03 DIAGNOSIS — R13.10 DYSPHAGIA, UNSPECIFIED TYPE: Primary | ICD-10-CM

## 2024-07-03 NOTE — TELEPHONE ENCOUNTER
From: Horace Otero  To: Jamilah Moran  Sent: 7/3/2024 2:47 PM CDT  Subject: Video swallow study     Hi doctor Dean, I called the speech therapist number that you provided. And they are going to look at my chart, but have asked that you put in an order for a video swallow study for me.    Is that something you can do?    Thank you!    Horace Otero

## 2024-07-05 ENCOUNTER — TELEPHONE (OUTPATIENT)
Dept: PHYSICAL THERAPY | Facility: HOSPITAL | Age: 39
End: 2024-07-05

## 2024-07-09 ENCOUNTER — ROUTINE PRENATAL (OUTPATIENT)
Dept: OBGYN CLINIC | Facility: CLINIC | Age: 39
End: 2024-07-09
Payer: COMMERCIAL

## 2024-07-09 VITALS
HEART RATE: 99 BPM | WEIGHT: 249.81 LBS | SYSTOLIC BLOOD PRESSURE: 125 MMHG | BODY MASS INDEX: 36 KG/M2 | DIASTOLIC BLOOD PRESSURE: 85 MMHG

## 2024-07-09 DIAGNOSIS — Z34.80 ENCOUNTER FOR SUPERVISION OF OTHER NORMAL PREGNANCY, UNSPECIFIED TRIMESTER (HCC): Primary | ICD-10-CM

## 2024-07-09 LAB
BILIRUBIN: NEGATIVE
GLUCOSE (URINE DIPSTICK): NEGATIVE MG/DL
KETONES (URINE DIPSTICK): NEGATIVE MG/DL
LEUKOCYTES: NEGATIVE
MULTISTIX EXPIRATION DATE: 8 1 24 DATE
MULTISTIX LOT#: NORMAL NUMERIC
NITRITE, URINE: NEGATIVE
OCCULT BLOOD: NEGATIVE
PH, URINE: 6.5 (ref 4.5–8)
PROTEIN (URINE DIPSTICK): NEGATIVE MG/DL
SPECIFIC GRAVITY: 1.01 (ref 1–1.03)
UROBILINOGEN,SEMI-QN: 0.2 MG/DL (ref 0–1.9)

## 2024-07-09 PROCEDURE — 81002 URINALYSIS NONAUTO W/O SCOPE: CPT | Performed by: OBSTETRICS & GYNECOLOGY

## 2024-07-09 RX ORDER — LORATADINE PSEUDOEPHEDRINE SULFATE 10; 240 MG/1; MG/1
1 TABLET, EXTENDED RELEASE ORAL DAILY
COMMUNITY

## 2024-07-09 NOTE — PROGRESS NOTES
Feeling well.  Dicussed level 2 US report and PL updated.  Traveling to [place] tomorrow for 20 days.  Travel instructions r/w patient.  RTC 4 wks.

## 2024-07-16 ENCOUNTER — PATIENT MESSAGE (OUTPATIENT)
Dept: OBGYN CLINIC | Facility: CLINIC | Age: 39
End: 2024-07-16

## 2024-07-16 NOTE — TELEPHONE ENCOUNTER
From: Horace Otero  To: Ranjit Velasco  Sent: 7/16/2024 8:56 AM CDT  Subject: Question about swelling in feet    Good morning Dr. Velasco,    We made it to [place]! We have been in [place] for 6 days so far and I wanted to check in because I have a new pregnancy symptom. I think it’s likely OK, but just wanted to check.    Since I have arrived I have had swelling in my feet and ankles. They generally do not hurt. But walking and especially walking in the heat is making them swell. It starts almost as soon as I get out of bed in the morning. Other than that I feel fine. I have been wearing my compression socks in the evenings when I can, elevating my feet when I can, drinking lots of water and in general taking lots of rest breaks if we are walking around. They also do swell if I’m sitting in a standard position with my feet on the floor for a long time.    I don’t feel alarmed, but want to make sure if there is something else I should be doing or if you have reason to believe I should be worried.     Thank you,    Horace Otero

## 2024-08-02 ENCOUNTER — ROUTINE PRENATAL (OUTPATIENT)
Dept: OBGYN CLINIC | Facility: CLINIC | Age: 39
End: 2024-08-02

## 2024-08-02 ENCOUNTER — OFFICE VISIT (OUTPATIENT)
Dept: OTOLARYNGOLOGY | Facility: CLINIC | Age: 39
End: 2024-08-02

## 2024-08-02 VITALS
HEART RATE: 93 BPM | DIASTOLIC BLOOD PRESSURE: 75 MMHG | WEIGHT: 255 LBS | BODY MASS INDEX: 37 KG/M2 | SYSTOLIC BLOOD PRESSURE: 121 MMHG

## 2024-08-02 VITALS — WEIGHT: 255 LBS | HEIGHT: 70 IN | BODY MASS INDEX: 36.51 KG/M2

## 2024-08-02 DIAGNOSIS — R05.9 COUGH, UNSPECIFIED TYPE: ICD-10-CM

## 2024-08-02 DIAGNOSIS — J38.3 VOCAL CORD DYSFUNCTION: Primary | ICD-10-CM

## 2024-08-02 DIAGNOSIS — K21.9 LPRD (LARYNGOPHARYNGEAL REFLUX DISEASE): ICD-10-CM

## 2024-08-02 DIAGNOSIS — Z34.82 ENCOUNTER FOR SUPERVISION OF OTHER NORMAL PREGNANCY IN SECOND TRIMESTER (HCC): Primary | ICD-10-CM

## 2024-08-02 LAB
APPEARANCE: CLEAR
BILIRUBIN: NEGATIVE
GLUCOSE (URINE DIPSTICK): NEGATIVE MG/DL
KETONES (URINE DIPSTICK): NEGATIVE MG/DL
LEUKOCYTES: NEGATIVE
MULTISTIX LOT#: NORMAL NUMERIC
NITRITE, URINE: NEGATIVE
OCCULT BLOOD: NEGATIVE
PH, URINE: 7 (ref 4.5–8)
PROTEIN (URINE DIPSTICK): NEGATIVE MG/DL
SPECIFIC GRAVITY: 1.01 (ref 1–1.03)
URINE-COLOR: YELLOW
UROBILINOGEN,SEMI-QN: 0.2 MG/DL (ref 0–1.9)

## 2024-08-02 PROCEDURE — 81002 URINALYSIS NONAUTO W/O SCOPE: CPT | Performed by: OBSTETRICS & GYNECOLOGY

## 2024-08-02 PROCEDURE — 99204 OFFICE O/P NEW MOD 45 MIN: CPT | Performed by: OTOLARYNGOLOGY

## 2024-08-02 RX ORDER — FAMOTIDINE 20 MG/1
20 TABLET, FILM COATED ORAL 2 TIMES DAILY
Qty: 60 TABLET | Refills: 1 | Status: SHIPPED | OUTPATIENT
Start: 2024-08-02

## 2024-08-02 NOTE — PROGRESS NOTES
NEW PATIENT PROGRESS NOTE  OTOLOGY/OTOLARYNGOLOGY    REF MD:  No referring provider defined for this encounter.    PCP: Jamilah Moran MD    CHIEF COMPLAINT:    Chief Complaint   Patient presents with    Voice Problem     Vocal cords closing when eating or drinking with trouble breathing at times       HISTORY OF PRESENT ILLNESS: Horace Otero is a 39 year old female who presents for evaluation of laryngospasm. Patient is 20 weeks pregnant. She states that ever since she was a child, she has frequently choked on meals, however it would resolve quickly and is not severe as it is now. Anything small, like rice, may easily irritate her throat, causing her to cough or choke severely. Her airway seems to constrict at the moment of choking. She feels like she cannot get enough oxygen in when she breathes. Endorses a hoarse voice afterwards. It lasts for a few minutes to at least one minute.  States that she had previously turned blue when choking. Choking has also occurred to the point of vomiting. She states that it is becoming increasingly more aggressive. She feels like she is swallowing normal and that this is occurring on its own. Patient denies smoking. Was not constantly exposed to smoking, and patient's father only occasionally smoked cigars. Patient states that she does have a history of acid reflux in her early adulthood; she used to take Pepcid for this. Endorses recently having some post-nasal drip. Patient was referred here by PCP.       PAST MEDICAL HISTORY:    Past Medical History:    ADHD    Alopecia    Anxiety    Depression    Migraines       PAST SURGICAL HISTORY:    Past Surgical History:   Procedure Laterality Date    Eye surgery  1995       Current Outpatient Medications on File Prior to Visit   Medication Sig Dispense Refill    loratadine-pseudoephedrine ER (CLARITIN-D 24 HOUR)  MG Oral Tablet 24 Hr Take 1 tablet by mouth daily.      lisdexamfetamine (VYVANSE) 50 MG Oral Cap Take 1 capsule  (50 mg total) by mouth every morning. 30 capsule 0    prenatal vitamin with DHA 27-0.8-228 MG Oral Cap Take 1 capsule by mouth daily.      escitalopram 20 MG Oral Tab Take 1 tablet (20 mg total) by mouth daily. 90 tablet 2    albuterol 108 (90 Base) MCG/ACT Inhalation Aero Soln Inhale 1 puff into the lungs every 6 (six) hours as needed for Shortness of Breath. 1 each 1     No current facility-administered medications on file prior to visit.       Allergies: No Known Allergies    SOCIAL HISTORY:    Social History     Tobacco Use    Smoking status: Never    Smokeless tobacco: Never   Substance Use Topics    Alcohol use: Not Currently     Comment: 2-3 drinks weekly       FAMILY HISTORY: Denies known family history of hearing loss, tinnitus, vertigo, or migraine.  Denies known family history of head and neck cancer, thyroid cancer, bleeding disorders.     REVIEW OF SYSTEMS:   Positives are in bold  Neuro: Headache, facial weakness, facial numbness, neck pain, vertigo  ENT: Hearing change, tinnitus, otorrhea, otalgia, aural fullness, ear pressure, vertigo, imbalance  Sinus pressure, rhinorrhea, congestion, facial pain, jaw pain, dysphagia, odynophagia, sore throat, voice changes, shortness of breath, cough    EXAMINATION:  I washed my hands with an alcohol-based hand gel prior to examination  Constitutional:   --Vitals: Height 5' 10\" (1.778 m), weight 255 lb (115.7 kg), last menstrual period 03/15/2024, not currently breastfeeding.  General: no apparent distress, well-developed, conversant  Psych: affect pleasant and appropriate for age, alert and oriented  Neuro: Cranial nerves: EOMI, Facial sensation intact to touch, palate elevates midline, tongue protrudes midline, shoulder shrug intact bilateral, facial movement normal bilateral  Respiratory: No stridor, stertor or increased work of breathing  ENT:  --Nose: no external nasal deformity, anterior rhinoscopy: Septum midline, no inferior turbinate hypertrophy, mucosa  healthy, no rhinorrhea  --OC/OP: No trismus. No masses or lesions noted over the gingiva, buccal mucosa, tongue, FOM, hard/soft palate, tonsillar pillars, posterior pharyngeal wall.  FOM/BOT are soft.   --Neck: No palpable cervical lymphadenopathy, no thyromegaly, no masses or lesions over the bilateral submandibular or parotid glands  --Ear: (bilateral ears were examined under binocular microscopy)  Right ear microscopic exam:  Pinna: Normal, no lesions or masses.  Mastoid: Nontender on palpation.   External auditory canal: Clear, no masses or lesions.  Tympanic membrane: Intact, no lesions, normal landmarks.  Middle ear: Aerated.    Left ear microscopic exam:  Pinna: Normal, no lesions or masses.  Mastoid: Nontender on palpation.   External auditory canal: Clear, no masses or lesions.  Tympanic membrane: Intact, no lesions, normal landmarks.  Middle ear: Aerated.    Flexible fiberoptic laryngoscopy (date 8/2/2024)  Informed consent obtained, patient was correctly identified  Topical anesthesia with aerosolized lidocaine and ephedrine spray in the bilateral nares  Findings: The flexible scope was advanced into the bilateral nares via the inferior meatus into the nasopharynx. No masses or lesions noted in the bilateral inferior meatus. The bilateral eustachian tubes are patent. No masses or lesions in the bilateral nasopharynx or fossae of Rosenmueller. No masses or lesions in the oropharynx, hypopharynx, supraglottis, glottis, subglottis. Normal TVF motion bilaterally in adduction and abduction. Thick mucus noted on TVF.  Notable interarytenoid edema/erythema  Complications none, procedure well tolerated       ASSESSMENT/PLAN:  Horace Otero is a 39 year old female with     ICD-10-CM   1. Vocal cord dysfunction  J38.3   2. LPRD (laryngopharyngeal reflux disease)  K21.9   3. Cough, unspecified type  R05.9        IMPRESSION:  Vocal cord dysfunction  Laryngopharyngeal reflux disease (LPRD)  Cough      PLAN:  -Recommend starting trial of famotidine 20 MG two times daily   -Start nasal saline rinses to help in reduction of post-nasal drip  -Patient education on vocal cord dysfunction given to patient   -Discussed lifestyle changes - stress reduction, limiting caffeine/coffee, spicy food, acidic/sour food, alcohol, greasy foods, waiting 2 hours after meal prior to laying down; additional information about LPRD is included in the patient instructions    -Schedule with speech therapy   -I will send this note to OB/gyn   -Follow up as needed       Situation reviewed with the patient in detail.    Attention: This note has been scribed by Andreia Srivastava under the supervision of Luis Rodriguez MD.     Luis Rodriguez MD  Otology/Otolaryngology  [address] [place] [address]  Phone [phone]  Fax [phone]      I have personally performed the services described in this documentation. All medical record entries made by the scribe were at my direction and in my presence. I have reviewed the chart and agree that the medical record reflects my personal performance and is accurate and complete.

## 2024-08-06 ENCOUNTER — PATIENT MESSAGE (OUTPATIENT)
Dept: INTERNAL MEDICINE CLINIC | Facility: CLINIC | Age: 39
End: 2024-08-06

## 2024-08-06 NOTE — TELEPHONE ENCOUNTER
From: Horace Otero  To: Jamilah Moran  Sent: 8/6/2024 11:00 AM CDT  Subject: Sick - looking for next steps/ advice     Hi Doctor Dean,     Since Friday night I have had some chest congestion. It has progressively gotten worse. I do not have a fever or sore muscles, and a Covid test came up negative. I have post nasal drip and am coughing up phlegm regularly. I tried sleeping with a humidifier last night and woke up in the middle of the night barely able to breathe- head felt full of fluid, was coughing up gunk for hours - so much so I started to choke on stomach acid coming up. Some of the phlegm was dark green. As I am pregnant, I don’t know what I am allowed to take? Is there anything that can be prescribed? Or anything over the counter I can take?     Note: I did see the ENT as you suggested, this past Friday, she did a scope and found signs of swelling, post nasal drip, mucus, and signs of acid reflux   Originally I thought my chest congestion might be due to the acid reflux, but it’s gotten so bad I don’t think so many more. My head also is starting to feel like a balloon from fluid buildup.     Thank you,    Horace Otero

## 2024-08-12 ENCOUNTER — PATIENT MESSAGE (OUTPATIENT)
Dept: INTERNAL MEDICINE CLINIC | Facility: CLINIC | Age: 39
End: 2024-08-12

## 2024-08-12 ENCOUNTER — TELEPHONE (OUTPATIENT)
Dept: INTERNAL MEDICINE CLINIC | Facility: CLINIC | Age: 39
End: 2024-08-12

## 2024-08-12 LAB
ALBUMIN SERPL-MCNC: 2.9 G/DL (ref 3.6–5.1)
ALBUMIN/GLOB SERPL: 0.7 {RATIO} (ref 1–2.4)
ALP SERPL-CCNC: 96 UNITS/L (ref 45–117)
ALT SERPL-CCNC: 35 UNITS/L
ANION GAP SERPL CALC-SCNC: 10 MMOL/L (ref 7–19)
AST SERPL-CCNC: 30 UNITS/L
BASOPHILS # BLD: 0.1 K/MCL (ref 0–0.3)
BASOPHILS NFR BLD: 1 %
BILIRUB SERPL-MCNC: 0.2 MG/DL (ref 0.2–1)
BUN SERPL-MCNC: 7 MG/DL (ref 6–20)
BUN/CREAT SERPL: 15 (ref 7–25)
CALCIUM SERPL-MCNC: 9.3 MG/DL (ref 8.4–10.2)
CHLORIDE SERPL-SCNC: 109 MMOL/L (ref 97–110)
CO2 SERPL-SCNC: 21 MMOL/L (ref 21–32)
CREAT SERPL-MCNC: 0.47 MG/DL (ref 0.51–0.95)
DEPRECATED RDW RBC: 45.1 FL (ref 39–50)
EGFRCR SERPLBLD CKD-EPI 2021: >90 ML/MIN/{1.73_M2}
EOSINOPHIL # BLD: 0.3 K/MCL (ref 0–0.5)
EOSINOPHIL NFR BLD: 3 %
ERYTHROCYTE [DISTWIDTH] IN BLOOD: 13.7 % (ref 11–15)
FASTING DURATION TIME PATIENT: ABNORMAL H
GLOBULIN SER-MCNC: 4.3 G/DL (ref 2–4)
GLUCOSE SERPL-MCNC: 128 MG/DL (ref 70–99)
HCT VFR BLD CALC: 37.8 % (ref 36–46.5)
HGB BLD-MCNC: 12.1 G/DL (ref 12–15.5)
IMM GRANULOCYTES # BLD AUTO: 0.2 K/MCL (ref 0–0.2)
IMM GRANULOCYTES # BLD: 2 %
LYMPHOCYTES # BLD: 2.9 K/MCL (ref 1–4.8)
LYMPHOCYTES NFR BLD: 24 %
MCH RBC QN AUTO: 29 PG (ref 26–34)
MCHC RBC AUTO-ENTMCNC: 32 G/DL (ref 32–36.5)
MCV RBC AUTO: 90.6 FL (ref 78–100)
MONOCYTES # BLD: 0.9 K/MCL (ref 0.3–0.9)
MONOCYTES NFR BLD: 8 %
NEUTROPHILS # BLD: 7.7 K/MCL (ref 1.8–7.7)
NEUTROPHILS NFR BLD: 62 %
NRBC BLD MANUAL-RTO: 0 /100 WBC
PLATELET # BLD AUTO: 356 K/MCL (ref 140–450)
POTASSIUM SERPL-SCNC: 3.4 MMOL/L (ref 3.4–5.1)
PROT SERPL-MCNC: 7.2 G/DL (ref 6.4–8.2)
RBC # BLD: 4.17 MIL/MCL (ref 4–5.2)
SODIUM SERPL-SCNC: 137 MMOL/L (ref 135–145)
WBC # BLD: 12.1 K/MCL (ref 4.2–11)

## 2024-08-12 PROCEDURE — 80053 COMPREHEN METABOLIC PANEL: CPT | Performed by: EMERGENCY MEDICINE

## 2024-08-12 PROCEDURE — 85025 COMPLETE CBC W/AUTO DIFF WBC: CPT | Performed by: EMERGENCY MEDICINE

## 2024-08-12 PROCEDURE — 99283 EMERGENCY DEPT VISIT LOW MDM: CPT

## 2024-08-12 PROCEDURE — 36415 COLL VENOUS BLD VENIPUNCTURE: CPT

## 2024-08-12 ASSESSMENT — PAIN SCALES - GENERAL: PAINLEVEL_OUTOF10: 0

## 2024-08-12 NOTE — TELEPHONE ENCOUNTER
I called pt and informed her that no attachment with a form was received. Pt informed that annual physical labs entered 1-2024 are good for 1 year. Pt will determine when best for her to have her labs drawn since she is pregnant and needs to have a glucose tolerance test done as well. Pt  will then bring form to clinic for Dr to fill out.

## 2024-08-12 NOTE — TELEPHONE ENCOUNTER
----- Message from [employer]  sent at 8/12/2024  1:31 PM CDT -----  Regarding: Biometric screening for work this week  Contact: [phone]  Hi Dr. Moran,     I need the following form submitted by Friday of this week with updated blood work.     Can you please confirm that the order you placed on 1/31/24 is still good - and covers what is needed for this form?     If so, I will go right away to [facility] and get this done this week.     Thank you!     Horace Otero

## 2024-08-13 ENCOUNTER — HOSPITAL ENCOUNTER (EMERGENCY)
Age: 39
Discharge: HOME | End: 2024-08-13
Attending: EMERGENCY MEDICINE

## 2024-08-13 VITALS
TEMPERATURE: 97.9 F | SYSTOLIC BLOOD PRESSURE: 131 MMHG | WEIGHT: 256 LBS | HEIGHT: 70 IN | HEART RATE: 86 BPM | DIASTOLIC BLOOD PRESSURE: 84 MMHG | BODY MASS INDEX: 36.65 KG/M2 | RESPIRATION RATE: 16 BRPM | OXYGEN SATURATION: 98 %

## 2024-08-13 DIAGNOSIS — L03.213 PERIORBITAL CELLULITIS OF LEFT EYE: Primary | ICD-10-CM

## 2024-08-13 PROCEDURE — 99283 EMERGENCY DEPT VISIT LOW MDM: CPT | Performed by: EMERGENCY MEDICINE

## 2024-08-13 ASSESSMENT — MOVEMENT AND STRENGTH ASSESSMENTS: HEAD_NECK: FULL RANGE OF MOTION

## 2024-08-14 ENCOUNTER — PATIENT MESSAGE (OUTPATIENT)
Dept: OBGYN CLINIC | Facility: CLINIC | Age: 39
End: 2024-08-14

## 2024-08-14 NOTE — TELEPHONE ENCOUNTER
From: Horace Otero  To: Bonita WOODALL Page  Sent: 8/14/2024 10:27 AM CDT  Subject: Antibiotics prescribed for Periorbital Cellulitis     Good morning, I wanted to make sure that it is OK that I have been prescribed Amoxicillin-Clavulanate Potassium tablets (875-125MG TAB). Two a day (1 in morning and 1 in evening, for 10 days) for an infection around my left eye. Prescribe by an ER doctor at [facility].     I have taken 3 doses so far (two yesterday, and one this morning).     Please let me know if this medication poses any problems for baby and if I need to stop taking it, and start taking something else.     Thank you!    Horace Otero

## 2024-08-28 ENCOUNTER — HOSPITAL ENCOUNTER (OUTPATIENT)
Dept: PERINATAL CARE | Facility: HOSPITAL | Age: 39
Discharge: HOME | End: 2024-08-28
Attending: OBSTETRICS & GYNECOLOGY
Payer: COMMERCIAL

## 2024-08-28 ENCOUNTER — LAB ENCOUNTER (OUTPATIENT)
Dept: LAB | Facility: HOSPITAL | Age: 39
End: 2024-08-28
Attending: OBSTETRICS & GYNECOLOGY
Payer: COMMERCIAL

## 2024-08-28 VITALS
WEIGHT: 260 LBS | SYSTOLIC BLOOD PRESSURE: 127 MMHG | DIASTOLIC BLOOD PRESSURE: 85 MMHG | HEART RATE: 105 BPM | BODY MASS INDEX: 37 KG/M2

## 2024-08-28 DIAGNOSIS — Z34.82 ENCOUNTER FOR SUPERVISION OF OTHER NORMAL PREGNANCY IN SECOND TRIMESTER (HCC): ICD-10-CM

## 2024-08-28 DIAGNOSIS — D25.9 UTERINE FIBROID DURING PREGNANCY, ANTEPARTUM (HCC): ICD-10-CM

## 2024-08-28 DIAGNOSIS — E66.09 OTHER OBESITY DUE TO EXCESS CALORIES AFFECTING PREGNANCY, ANTEPARTUM (HCC): ICD-10-CM

## 2024-08-28 DIAGNOSIS — O99.210 OTHER OBESITY DUE TO EXCESS CALORIES AFFECTING PREGNANCY, ANTEPARTUM (HCC): ICD-10-CM

## 2024-08-28 DIAGNOSIS — O09.513 AMA (ADVANCED MATERNAL AGE) PRIMIGRAVIDA 35+, THIRD TRIMESTER (HCC): ICD-10-CM

## 2024-08-28 DIAGNOSIS — O09.511 PRIMIGRAVIDA OF ADVANCED MATERNAL AGE IN FIRST TRIMESTER (HCC): ICD-10-CM

## 2024-08-28 DIAGNOSIS — O09.899 SINGLE UMBILICAL ARTERY, MATERNAL, ANTEPARTUM (HCC): ICD-10-CM

## 2024-08-28 DIAGNOSIS — O34.10 UTERINE FIBROID DURING PREGNANCY, ANTEPARTUM (HCC): ICD-10-CM

## 2024-08-28 DIAGNOSIS — O09.511 PRIMIGRAVIDA OF ADVANCED MATERNAL AGE IN FIRST TRIMESTER (HCC): Primary | ICD-10-CM

## 2024-08-28 LAB
DEPRECATED RDW RBC AUTO: 43.6 FL (ref 35.1–46.3)
ERYTHROCYTE [DISTWIDTH] IN BLOOD BY AUTOMATED COUNT: 13.8 % (ref 11–15)
GLUCOSE 1H P GLC SERPL-MCNC: 137 MG/DL
HCT VFR BLD AUTO: 38.3 %
HGB BLD-MCNC: 12.9 G/DL
MCH RBC QN AUTO: 29.3 PG (ref 26–34)
MCHC RBC AUTO-ENTMCNC: 33.7 G/DL (ref 31–37)
MCV RBC AUTO: 86.8 FL
PLATELET # BLD AUTO: 298 10(3)UL (ref 150–450)
RBC # BLD AUTO: 4.41 X10(6)UL
WBC # BLD AUTO: 11.3 X10(3) UL (ref 4–11)

## 2024-08-28 PROCEDURE — 82950 GLUCOSE TEST: CPT

## 2024-08-28 PROCEDURE — 76816 OB US FOLLOW-UP PER FETUS: CPT | Performed by: OBSTETRICS & GYNECOLOGY

## 2024-08-28 PROCEDURE — 85027 COMPLETE CBC AUTOMATED: CPT

## 2024-08-28 PROCEDURE — 36415 COLL VENOUS BLD VENIPUNCTURE: CPT

## 2024-08-28 NOTE — PROGRESS NOTES
Reason for Consult:   Dear Dr. Perkins,    Thank you for requesting ultrasound evaluation and maternal fetal medicine consultation on Horace Otero.  As you are aware she is a 39 year old female with a Kauffman pregnancy with Estimated Date of Delivery: 24 .  A maternal-fetal medicine f/u is today..  Her prenatal records and labs were reviewed.    Feeling good.  No complaints.  Review of History:     OB History:    OB History    Para Term  AB Living   1 0 0 0 0 0   SAB IAB Ectopic Multiple Live Births   0 0 0 0 0      # Outcome Date GA Lbr Danie/2nd Weight Sex Type Anes PTL Lv   1 Current                      Allergies:  No Known Allergies   Current Meds:  Current Outpatient Medications   Medication Sig Dispense Refill    famotidine 20 MG Oral Tab Take 1 tablet (20 mg total) by mouth 2 (two) times daily. 60 tablet 1    loratadine-pseudoephedrine ER (CLARITIN-D 24 HOUR)  MG Oral Tablet 24 Hr Take 1 tablet by mouth daily.      lisdexamfetamine (VYVANSE) 50 MG Oral Cap Take 1 capsule (50 mg total) by mouth every morning. 30 capsule 0    prenatal vitamin with DHA 27-0.8-228 MG Oral Cap Take 1 capsule by mouth daily.      escitalopram 20 MG Oral Tab Take 1 tablet (20 mg total) by mouth daily. 90 tablet 2    albuterol 108 (90 Base) MCG/ACT Inhalation Aero Soln Inhale 1 puff into the lungs every 6 (six) hours as needed for Shortness of Breath. 1 each 1        HISTORY:  Past Medical History:    ADHD    Alopecia    Anxiety    Depression    Migraines      Past Surgical History:   Procedure Laterality Date    Eye surgery        Family History   Problem Relation Age of Onset    Depression Mother     Anxiety Mother     Hypertension Mother     Other (Other) Mother         Parkinson's/ Hepatitis from needle stick in     Diabetes Paternal Grandmother     Psychiatric Paternal Grandfather         Bipolar disorder, schizophrenia r/t PTSD related to  service    Blood Disorder Sister      Psoriasis Sister     Asthma Sister     Diabetes Other     Breast Cancer Other       Social History     Socioeconomic History    Marital status:    Tobacco Use    Smoking status: Never    Smokeless tobacco: Never   Vaping Use    Vaping status: Never Used   Substance and Sexual Activity    Alcohol use: Not Currently     Comment: 2-3 drinks weekly    Drug use: No    Sexual activity: Yes     Birth control/protection: OCP   Other Topics Concern    Caffeine Concern Yes     Comment: Coffee 1 cup daily     Social Determinants of Health     Financial Resource Strain: Low Risk  (4/29/2024)    Financial Resource Strain     Difficulty of Paying Living Expenses: Not hard at all     Med Affordability: No   Food Insecurity: No Food Insecurity (4/29/2024)    Food Insecurity     Food Insecurity: Never true   Transportation Needs: No Transportation Needs (4/29/2024)    Transportation Needs     Lack of Transportation: No   Stress: No Stress Concern Present (4/29/2024)    Stress     Feeling of Stress : No   Housing Stability: Low Risk  (4/29/2024)    Housing Stability     Housing Instability: No        NARRATIVE:   /85   Pulse 105   Wt 260 lb (117.9 kg)   LMP 03/15/2024   BMI 37.31 kg/m²            Alert and Oriented.  No acute distress          Abdomen:  soft, nontender, no contractions noted.                      DISCUSSION  During her visit we discussed and reviewed the following issues:  ADVANCED MATERNAL AGE    Background  I reviewed with the patient that pregnancies in women of advanced maternal age (35 or older at delivery) are associated with elevated risks. Specifically, there is a higher rate of:  Fetal malformations  Preeclampsia  Gestational diabetes  Intrauterine fetal death   Please see previous [facility] detailed discussion.     ---------  OBESITY:   Please see previous [facility] detailed discussion.     ----------      FIBROIDS:  I informed her of the fibroids.  Fibroids can increase in size during pregnancy and  could lead to abdominal pain with an increased risk of  labor.  Most cases are unremarkable.  Fibroids can obstruct the pelvis and increase the risk for a . We discussed the morbidity and mortality associated with prematurity at various gestational ages.  The signs and symptoms of  labor were discussed.        We also discussed the single umbilical artery (SUA).  SUA is not an uncommon finding and the incidence is reported to be between 0.5-6% depending on the population studied.  SUA has been associated with genitourinary and cardiac abnormalities.  The risk for aneuploidy is not increased in cases of isolated SUA, but is high in cases in which structural abnormalities are also found.  There is an increased risk for fetal growth restriction which is thought to be due to placental abnormality.  Deliveries are generally one week earlier in fetuses with SUA but the mean age at delivery is 38+ vs 39+ in babies with 3 vessel cords.   testing is recommended to help reduce the risk of stillbirth and I recommend weekly NST ~34 weeks.    A third trimester ultrasound is recommended to reevaluate fetal anatomy and assess growth.  If fetal growth restriction is detected, additional fetal monitoring is indicated.   If the 4 ch and great vessel views of the heart are normal, fetal echocardiogram is not necessary.      With her risk factors, SUA, and  and large fibroid with 2 smaller, I reocmmended growth US monthly in thrid trimester.    OB ULTRASOUND REPORT   See imaging tab for complete ultrasound report     Single IUP in cephalic presentation.    Placenta is posterior.   A 2 vessel cord is noted.  Cardiac activity is present at 151 bpm  EFW 1423 g ( 3 lb 2 oz); 63%.    MVP is 6.3 cm . GARY 17.4 cm      Uterus  Fibroid(s)   1.  Size 77 mm x 58 mm x 50 mm. Mean 61.7 mm. Vol 116.920 cm³. Fundal  2.  Size 98 mm x 87 mm x 85 mm. Mean 90.0 mm. Vol 379.457 cm³. Left lateral wall      IMPRESSION:   1.  IUP @  28w6d   2. Scan consistent with dates  3. No fetal structural abnormalities seen   4.  AMA--CVS 46XX  5.  Fibroids  6.   Obesity BMI 33  7.  Single umbilical artery    RECOMMENDATIONS:   1.  Weekly NST at 34wks  2.  Monthly Growth US  with BPP  at 32wks      Thank you for allowing me to participate in the care of your patient.  Please do not hesitate to call with any questions or concerns.     Total time spent   20   minutes this calendar day which includes preparing to see the patient including chart review, obtaining and/or reviewing additional medical history, performing a physical exam and evaluation, documenting clinical information in the electronic medical record, independently interpreting results, counseling the patient, communicating results to the patient/family/caregiver and coordinating care.    Alvaro Singletary D.O.  Maternal Fetal Medicine      Note to patient and family:  The 21st Century Cures Act makes medical notes available to patients in the interest of transparency.  However, please be advised that this is a medical document.  It is intended as a peer to peer communication.  It is written in medical language and may contain abbreviations or verbiage that are technical and unfamiliar.  It may appear blunt or direct.  Medical documents are intended to carry relevant information, facts as evident, and the clinical opinion of the practitioner.

## 2024-08-29 ENCOUNTER — TELEPHONE (OUTPATIENT)
Dept: OBGYN CLINIC | Facility: CLINIC | Age: 39
End: 2024-08-29

## 2024-08-29 DIAGNOSIS — Z34.80 ENCOUNTER FOR SUPERVISION OF OTHER NORMAL PREGNANCY, UNSPECIFIED TRIMESTER (HCC): Primary | ICD-10-CM

## 2024-08-29 DIAGNOSIS — R73.09 ABNORMAL GTT (GLUCOSE TOLERANCE TEST): ICD-10-CM

## 2024-08-29 NOTE — TELEPHONE ENCOUNTER
----- Message from Delfina Longoria sent at 8/29/2024  1:34 PM CDT -----  Failed 1 hr glucola.   Needs 3hr GTT

## 2024-08-29 NOTE — TELEPHONE ENCOUNTER
Patient informed of results and recommendations for 3 hr gtt.  Patient instructed to fast for 12 hours before t est and to call for an appointment.  Phone number provided.Order placed.

## 2024-08-30 ENCOUNTER — ROUTINE PRENATAL (OUTPATIENT)
Dept: OBGYN CLINIC | Facility: CLINIC | Age: 39
End: 2024-08-30

## 2024-08-30 VITALS
DIASTOLIC BLOOD PRESSURE: 85 MMHG | BODY MASS INDEX: 38 KG/M2 | HEART RATE: 81 BPM | SYSTOLIC BLOOD PRESSURE: 123 MMHG | WEIGHT: 262 LBS

## 2024-08-30 DIAGNOSIS — Z34.91 ENCOUNTER FOR SUPERVISION OF NORMAL PREGNANCY IN FIRST TRIMESTER, UNSPECIFIED GRAVIDITY (HCC): Primary | ICD-10-CM

## 2024-08-30 LAB
APPEARANCE: CLEAR
BILIRUBIN: NEGATIVE
GLUCOSE (URINE DIPSTICK): NEGATIVE MG/DL
MULTISTIX LOT#: ABNORMAL NUMERIC
NITRITE, URINE: NEGATIVE
OCCULT BLOOD: NEGATIVE
PH, URINE: 6.5 (ref 4.5–8)
PROTEIN (URINE DIPSTICK): NEGATIVE MG/DL
SPECIFIC GRAVITY: 1.01 (ref 1–1.03)
URINE-COLOR: YELLOW
UROBILINOGEN,SEMI-QN: 0.2 MG/DL (ref 0–1.9)

## 2024-08-30 PROCEDURE — 81002 URINALYSIS NONAUTO W/O SCOPE: CPT | Performed by: OBSTETRICS & GYNECOLOGY

## 2024-08-30 NOTE — PROGRESS NOTES
States having increased anxiety symptoms- will see APN next week - currently taking lexapro 20mg daily as well as vyvance.  Will get Tdap next week

## 2024-09-06 ENCOUNTER — LAB ENCOUNTER (OUTPATIENT)
Dept: LAB | Facility: REFERENCE LAB | Age: 39
End: 2024-09-06
Attending: OBSTETRICS & GYNECOLOGY
Payer: COMMERCIAL

## 2024-09-06 DIAGNOSIS — Z34.80 ENCOUNTER FOR SUPERVISION OF OTHER NORMAL PREGNANCY, UNSPECIFIED TRIMESTER (HCC): ICD-10-CM

## 2024-09-06 DIAGNOSIS — R73.9 HYPERGLYCEMIA: ICD-10-CM

## 2024-09-06 DIAGNOSIS — R73.09 ABNORMAL GTT (GLUCOSE TOLERANCE TEST): ICD-10-CM

## 2024-09-06 DIAGNOSIS — Z00.00 ROUTINE HEALTH MAINTENANCE: ICD-10-CM

## 2024-09-06 LAB
ALBUMIN SERPL-MCNC: 3.8 G/DL (ref 3.2–4.8)
ALBUMIN/GLOB SERPL: 1.4 {RATIO} (ref 1–2)
ALP LIVER SERPL-CCNC: 115 U/L
ALT SERPL-CCNC: 16 U/L
ANION GAP SERPL CALC-SCNC: 10 MMOL/L (ref 0–18)
AST SERPL-CCNC: 30 U/L (ref ?–34)
BASOPHILS # BLD AUTO: 0.08 X10(3) UL (ref 0–0.2)
BASOPHILS NFR BLD AUTO: 0.7 %
BILIRUB SERPL-MCNC: 0.2 MG/DL (ref 0.3–1.2)
BUN BLD-MCNC: 8 MG/DL (ref 9–23)
BUN/CREAT SERPL: 12.5 (ref 10–20)
CALCIUM BLD-MCNC: 9.6 MG/DL (ref 8.7–10.4)
CHLORIDE SERPL-SCNC: 106 MMOL/L (ref 98–112)
CHOLEST SERPL-MCNC: 163 MG/DL (ref ?–200)
CO2 SERPL-SCNC: 21 MMOL/L (ref 21–32)
CREAT BLD-MCNC: 0.64 MG/DL
DEPRECATED RDW RBC AUTO: 43.8 FL (ref 35.1–46.3)
EGFRCR SERPLBLD CKD-EPI 2021: 117 ML/MIN/1.73M2 (ref 60–?)
EOSINOPHIL # BLD AUTO: 0.29 X10(3) UL (ref 0–0.7)
EOSINOPHIL NFR BLD AUTO: 2.7 %
ERYTHROCYTE [DISTWIDTH] IN BLOOD BY AUTOMATED COUNT: 13.6 % (ref 11–15)
EST. AVERAGE GLUCOSE BLD GHB EST-MCNC: 111 MG/DL (ref 68–126)
FASTING PATIENT LIPID ANSWER: YES
FASTING STATUS PATIENT QL REPORTED: YES
GLOBULIN PLAS-MCNC: 2.8 G/DL (ref 2–3.5)
GLUCOSE 1H P GLC SERPL-MCNC: 128 MG/DL
GLUCOSE 2H P GLC SERPL-MCNC: 138 MG/DL
GLUCOSE 3H P GLC SERPL-MCNC: 121 MG/DL (ref 70–140)
GLUCOSE BLD-MCNC: 98 MG/DL (ref 70–99)
GLUCOSE P FAST SERPL-MCNC: 98 MG/DL
HBA1C MFR BLD: 5.5 % (ref ?–5.7)
HCT VFR BLD AUTO: 38.4 %
HDLC SERPL-MCNC: 53 MG/DL (ref 40–59)
HGB BLD-MCNC: 12.7 G/DL
IMM GRANULOCYTES # BLD AUTO: 0.13 X10(3) UL (ref 0–1)
IMM GRANULOCYTES NFR BLD: 1.2 %
LDLC SERPL CALC-MCNC: 65 MG/DL (ref ?–100)
LYMPHOCYTES # BLD AUTO: 2.38 X10(3) UL (ref 1–4)
LYMPHOCYTES NFR BLD AUTO: 22.2 %
MCH RBC QN AUTO: 28.9 PG (ref 26–34)
MCHC RBC AUTO-ENTMCNC: 33.1 G/DL (ref 31–37)
MCV RBC AUTO: 87.5 FL
MONOCYTES # BLD AUTO: 0.81 X10(3) UL (ref 0.1–1)
MONOCYTES NFR BLD AUTO: 7.6 %
NEUTROPHILS # BLD AUTO: 7.03 X10 (3) UL (ref 1.5–7.7)
NEUTROPHILS # BLD AUTO: 7.03 X10(3) UL (ref 1.5–7.7)
NEUTROPHILS NFR BLD AUTO: 65.6 %
NONHDLC SERPL-MCNC: 110 MG/DL (ref ?–130)
OSMOLALITY SERPL CALC.SUM OF ELEC: 282 MOSM/KG (ref 275–295)
PLATELET # BLD AUTO: 293 10(3)UL (ref 150–450)
POTASSIUM SERPL-SCNC: 4.1 MMOL/L (ref 3.5–5.1)
PROT SERPL-MCNC: 6.6 G/DL (ref 5.7–8.2)
RBC # BLD AUTO: 4.39 X10(6)UL
SODIUM SERPL-SCNC: 137 MMOL/L (ref 136–145)
TRIGL SERPL-MCNC: 282 MG/DL (ref 30–149)
TSI SER-ACNC: 2.74 MIU/ML (ref 0.55–4.78)
VIT D+METAB SERPL-MCNC: 29.7 NG/ML (ref 30–100)
VLDLC SERPL CALC-MCNC: 42 MG/DL (ref 0–30)
WBC # BLD AUTO: 10.7 X10(3) UL (ref 4–11)

## 2024-09-06 PROCEDURE — 82306 VITAMIN D 25 HYDROXY: CPT

## 2024-09-06 PROCEDURE — 82951 GLUCOSE TOLERANCE TEST (GTT): CPT

## 2024-09-06 PROCEDURE — 83036 HEMOGLOBIN GLYCOSYLATED A1C: CPT

## 2024-09-06 PROCEDURE — 36415 COLL VENOUS BLD VENIPUNCTURE: CPT

## 2024-09-06 PROCEDURE — 82952 GTT-ADDED SAMPLES: CPT

## 2024-09-06 PROCEDURE — 85025 COMPLETE CBC W/AUTO DIFF WBC: CPT

## 2024-09-06 PROCEDURE — 84443 ASSAY THYROID STIM HORMONE: CPT

## 2024-09-06 PROCEDURE — 80053 COMPREHEN METABOLIC PANEL: CPT

## 2024-09-06 PROCEDURE — 80061 LIPID PANEL: CPT

## 2024-09-09 ENCOUNTER — PATIENT MESSAGE (OUTPATIENT)
Dept: OBGYN CLINIC | Facility: CLINIC | Age: 39
End: 2024-09-09

## 2024-09-09 ENCOUNTER — PATIENT MESSAGE (OUTPATIENT)
Dept: INTERNAL MEDICINE CLINIC | Facility: CLINIC | Age: 39
End: 2024-09-09

## 2024-09-09 ENCOUNTER — ROUTINE PRENATAL (OUTPATIENT)
Dept: OBGYN CLINIC | Facility: CLINIC | Age: 39
End: 2024-09-09

## 2024-09-09 VITALS
HEART RATE: 101 BPM | SYSTOLIC BLOOD PRESSURE: 134 MMHG | WEIGHT: 260 LBS | BODY MASS INDEX: 37 KG/M2 | DIASTOLIC BLOOD PRESSURE: 90 MMHG

## 2024-09-09 DIAGNOSIS — Z34.03 ENCOUNTER FOR SUPERVISION OF NORMAL FIRST PREGNANCY IN THIRD TRIMESTER (HCC): Primary | ICD-10-CM

## 2024-09-09 PROCEDURE — 81002 URINALYSIS NONAUTO W/O SCOPE: CPT | Performed by: OBSTETRICS & GYNECOLOGY

## 2024-09-09 NOTE — TELEPHONE ENCOUNTER
From: Horace Otero  To: Bonita WOODALL Page  Sent: 9/9/2024 5:09 PM CDT  Subject: Vitamin D supplement    Hi Doctor Page,    My primary care doctor (Jamilah Moran) has suggested that I start taking 1000 units of Vitamin D, in addition to my prenatal due to my recent low levels of vitamin D.    Wanted to check with you first, is that OK?    Thank you!     AP

## 2024-09-23 ENCOUNTER — ROUTINE PRENATAL (OUTPATIENT)
Dept: OBGYN CLINIC | Facility: CLINIC | Age: 39
End: 2024-09-23

## 2024-09-23 VITALS
BODY MASS INDEX: 38 KG/M2 | DIASTOLIC BLOOD PRESSURE: 88 MMHG | WEIGHT: 264.19 LBS | HEART RATE: 74 BPM | SYSTOLIC BLOOD PRESSURE: 135 MMHG

## 2024-09-23 DIAGNOSIS — Z34.93 ENCOUNTER FOR SUPERVISION OF NORMAL PREGNANCY IN THIRD TRIMESTER, UNSPECIFIED GRAVIDITY (HCC): Primary | ICD-10-CM

## 2024-09-23 LAB
BILIRUBIN: NEGATIVE
GLUCOSE (URINE DIPSTICK): NEGATIVE MG/DL
KETONES (URINE DIPSTICK): NEGATIVE MG/DL
MULTISTIX LOT#: ABNORMAL NUMERIC
NITRITE, URINE: NEGATIVE
OCCULT BLOOD: NEGATIVE
PH, URINE: 6.5 (ref 4.5–8)
SPECIFIC GRAVITY: 1.01 (ref 1–1.03)
UROBILINOGEN,SEMI-QN: 0.2 MG/DL (ref 0–1.9)

## 2024-09-23 PROCEDURE — 81002 URINALYSIS NONAUTO W/O SCOPE: CPT | Performed by: OBSTETRICS & GYNECOLOGY

## 2024-09-25 ENCOUNTER — HOSPITAL ENCOUNTER (OUTPATIENT)
Dept: PERINATAL CARE | Facility: HOSPITAL | Age: 39
Discharge: HOME | End: 2024-09-25
Attending: OBSTETRICS & GYNECOLOGY
Payer: COMMERCIAL

## 2024-09-25 VITALS
DIASTOLIC BLOOD PRESSURE: 84 MMHG | WEIGHT: 264 LBS | HEART RATE: 114 BPM | SYSTOLIC BLOOD PRESSURE: 127 MMHG | BODY MASS INDEX: 38 KG/M2

## 2024-09-25 DIAGNOSIS — O09.511 PRIMIGRAVIDA OF ADVANCED MATERNAL AGE IN FIRST TRIMESTER (HCC): Primary | ICD-10-CM

## 2024-09-25 DIAGNOSIS — O99.210 OTHER OBESITY DUE TO EXCESS CALORIES AFFECTING PREGNANCY, ANTEPARTUM (HCC): ICD-10-CM

## 2024-09-25 DIAGNOSIS — E66.09 OTHER OBESITY DUE TO EXCESS CALORIES AFFECTING PREGNANCY, ANTEPARTUM (HCC): ICD-10-CM

## 2024-09-25 DIAGNOSIS — O09.511 PRIMIGRAVIDA OF ADVANCED MATERNAL AGE IN FIRST TRIMESTER (HCC): ICD-10-CM

## 2024-09-25 DIAGNOSIS — O09.899: ICD-10-CM

## 2024-09-25 PROCEDURE — 76819 FETAL BIOPHYS PROFIL W/O NST: CPT

## 2024-09-25 PROCEDURE — 76816 OB US FOLLOW-UP PER FETUS: CPT | Performed by: OBSTETRICS & GYNECOLOGY

## 2024-09-25 NOTE — PROGRESS NOTES
Reason for Consult:   Dear Dr. Perkins,    Thank you for requesting ultrasound evaluation and maternal fetal medicine consultation on Horace Otero.  As you are aware she is a 39 year old female with a Kauffman pregnancy with Estimated Date of Delivery: 24 .  A maternal-fetal medicine f/u is today..  Her prenatal records and labs were reviewed.    Feeling good.  No complaints.  Review of History:     OB History:    OB History    Para Term  AB Living   1 0 0 0 0 0   SAB IAB Ectopic Multiple Live Births   0 0 0 0 0      # Outcome Date GA Lbr Danie/2nd Weight Sex Type Anes PTL Lv   1 Current                      Allergies:  No Known Allergies   Current Meds:  Current Outpatient Medications   Medication Sig Dispense Refill    escitalopram 5 MG Oral Tab Take 1 tablet (5 mg total) by mouth daily. Along w 20 mg for total 25 mg daily. 30 tablet 5    famotidine 20 MG Oral Tab Take 1 tablet (20 mg total) by mouth 2 (two) times daily. 60 tablet 1    loratadine-pseudoephedrine ER (CLARITIN-D 24 HOUR)  MG Oral Tablet 24 Hr Take 1 tablet by mouth daily.      lisdexamfetamine (VYVANSE) 50 MG Oral Cap Take 1 capsule (50 mg total) by mouth every morning. 30 capsule 0    prenatal vitamin with DHA 27-0.8-228 MG Oral Cap Take 1 capsule by mouth daily.      escitalopram 20 MG Oral Tab Take 1 tablet (20 mg total) by mouth daily. 90 tablet 2    albuterol 108 (90 Base) MCG/ACT Inhalation Aero Soln Inhale 1 puff into the lungs every 6 (six) hours as needed for Shortness of Breath. 1 each 1        HISTORY:  Past Medical History:    ADHD    Alopecia    Anxiety    Depression    Migraines      Past Surgical History:   Procedure Laterality Date    Eye surgery        Family History   Problem Relation Age of Onset    Depression Mother     Anxiety Mother     Hypertension Mother     Other (Other) Mother         Parkinson's/ Hepatitis from needle stick in     Diabetes Paternal Grandmother     Psychiatric Paternal  Grandfather         Bipolar disorder, schizophrenia r/t PTSD related to  service    Blood Disorder Sister     Psoriasis Sister     Asthma Sister     Diabetes Other     Breast Cancer Other       Social History     Socioeconomic History    Marital status:    Tobacco Use    Smoking status: Never    Smokeless tobacco: Never   Vaping Use    Vaping status: Never Used   Substance and Sexual Activity    Alcohol use: Not Currently     Comment: 2-3 drinks weekly    Drug use: No    Sexual activity: Yes     Birth control/protection: OCP   Other Topics Concern    Caffeine Concern Yes     Comment: Coffee 1 cup daily     Social Determinants of Health     Financial Resource Strain: Low Risk  (4/29/2024)    Financial Resource Strain     Difficulty of Paying Living Expenses: Not hard at all     Med Affordability: No   Food Insecurity: No Food Insecurity (4/29/2024)    Food Insecurity     Food Insecurity: Never true   Transportation Needs: No Transportation Needs (4/29/2024)    Transportation Needs     Lack of Transportation: No   Stress: No Stress Concern Present (4/29/2024)    Stress     Feeling of Stress : No   Housing Stability: Low Risk  (4/29/2024)    Housing Stability     Housing Instability: No        NARRATIVE:   /84   Pulse 114   Wt 264 lb (119.7 kg)   LMP 03/15/2024   BMI 37.88 kg/m²            Alert and Oriented.  No acute distress          Abdomen:  soft, nontender, no contractions noted.                      DISCUSSION  During her visit we discussed and reviewed the following issues:  ADVANCED MATERNAL AGE    Background  I reviewed with the patient that pregnancies in women of advanced maternal age (35 or older at delivery) are associated with elevated risks. Specifically, there is a higher rate of:  Fetal malformations  Preeclampsia  Gestational diabetes  Intrauterine fetal death   Please see previous [facility] detailed discussion.     ---------  OBESITY:   Please see previous [facility] detailed discussion.      ----------      FIBROIDS:  I informed her of the fibroids.  Fibroids can increase in size during pregnancy and could lead to abdominal pain with an increased risk of  labor.  Most cases are unremarkable.  Fibroids can obstruct the pelvis and increase the risk for a . We discussed the morbidity and mortality associated with prematurity at various gestational ages.  The signs and symptoms of  labor were discussed.        We also discussed the single umbilical artery (SUA).  SUA is not an uncommon finding and the incidence is reported to be between 0.5-6% depending on the population studied.  SUA has been associated with genitourinary and cardiac abnormalities.  The risk for aneuploidy is not increased in cases of isolated SUA, but is high in cases in which structural abnormalities are also found.  There is an increased risk for fetal growth restriction which is thought to be due to placental abnormality.  Deliveries are generally one week earlier in fetuses with SUA but the mean age at delivery is 38+ vs 39+ in babies with 3 vessel cords.   testing is recommended to help reduce the risk of stillbirth and I recommend weekly NST ~34 weeks.    A third trimester ultrasound is recommended to reevaluate fetal anatomy and assess growth.  If fetal growth restriction is detected, additional fetal monitoring is indicated.   If the 4 ch and great vessel views of the heart are normal, fetal echocardiogram is not necessary.      With her risk factors, SUA, and  and large fibroid with 2 smaller, I reocmmended growth US monthly in thrid trimester.      OB ULTRASOUND REPORT   See imaging tab for complete ultrasound report or in PACS    Single IUP in cephalic presentation.    Placenta is posterior.   A 2 vessel cord is noted.  Cardiac activity is present at 136 bpm  EFW 2042 g ( 4 lb 8 oz); 44%.    GARY is  22.7 cm.  MVP is 7.9 cm      BIOPHYSICAL PROFILE:  Movement:    2/2  Tone:             2/  Breathin/2  Fluid:               TOTAL:                 IMPRESSION:   1. IUP @  32w6d   2. Scan consistent with dates  3. No fetal structural abnormalities seen   4.  AMA--CVS 46XX  5.  Fibroids  6.   Obesity BMI 33  7.  Single umbilical artery    RECOMMENDATIONS:   1.  Weekly NST   2.  Monthly Growth US  with BPP   3.  Fetal movement counts      Thank you for allowing me to participate in the care of your patient.  Please do not hesitate to call with any questions or concerns.     Total time spent   20   minutes this calendar day which includes preparing to see the patient including chart review, obtaining and/or reviewing additional medical history, performing a physical exam and evaluation, documenting clinical information in the electronic medical record, independently interpreting results, counseling the patient, communicating results to the patient/family/caregiver and coordinating care.    Alvaro Singletary D.O.  Maternal Fetal Medicine      Note to patient and family:  The 21st Century Cures Act makes medical notes available to patients in the interest of transparency.  However, please be advised that this is a medical document.  It is intended as a peer to peer communication.  It is written in medical language and may contain abbreviations or verbiage that are technical and unfamiliar.  It may appear blunt or direct.  Medical documents are intended to carry relevant information, facts as evident, and the clinical opinion of the practitioner.

## 2024-10-01 ENCOUNTER — PATIENT MESSAGE (OUTPATIENT)
Dept: OBGYN CLINIC | Facility: CLINIC | Age: 39
End: 2024-10-01

## 2024-10-01 NOTE — TELEPHONE ENCOUNTER
From: Horace Otero  To: Bonita WOODALL Page  Sent: 10/1/2024 1:24 PM CDT  Subject: Upcoming return OB visits     Good afternoon,    I noticed that I don’t have any return OB appointments scheduled after October 14.     I imagine that I should? Can you please confirm if I need to get more scheduled, and at what frequency?    Thank you!     Horace Otero

## 2024-10-03 RX ORDER — FAMOTIDINE 20 MG/1
20 TABLET, FILM COATED ORAL 2 TIMES DAILY
Qty: 60 TABLET | Refills: 1 | Status: SHIPPED | OUTPATIENT
Start: 2024-10-03

## 2024-10-04 ENCOUNTER — TELEPHONE (OUTPATIENT)
Dept: OBGYN CLINIC | Facility: CLINIC | Age: 39
End: 2024-10-04

## 2024-10-08 ENCOUNTER — HOSPITAL ENCOUNTER (INPATIENT)
Facility: HOSPITAL | Age: 39
LOS: 3 days | Discharge: HOME | End: 2024-10-11
Attending: OBSTETRICS & GYNECOLOGY | Admitting: OBSTETRICS & GYNECOLOGY
Payer: COMMERCIAL

## 2024-10-08 ENCOUNTER — ROUTINE PRENATAL (OUTPATIENT)
Dept: OBGYN CLINIC | Facility: CLINIC | Age: 39
End: 2024-10-08

## 2024-10-08 VITALS
HEART RATE: 102 BPM | BODY MASS INDEX: 39 KG/M2 | DIASTOLIC BLOOD PRESSURE: 90 MMHG | WEIGHT: 270 LBS | SYSTOLIC BLOOD PRESSURE: 134 MMHG

## 2024-10-08 DIAGNOSIS — Z34.03 ENCOUNTER FOR SUPERVISION OF NORMAL FIRST PREGNANCY IN THIRD TRIMESTER (HCC): Primary | ICD-10-CM

## 2024-10-08 PROBLEM — Z34.90 PREGNANCY (HCC): Status: ACTIVE | Noted: 2024-10-08

## 2024-10-08 PROBLEM — O13.3 GESTATIONAL HYPERTENSION, THIRD TRIMESTER (HCC): Status: ACTIVE | Noted: 2024-10-08

## 2024-10-08 LAB
ALBUMIN SERPL-MCNC: 3.9 G/DL (ref 3.2–4.8)
ALBUMIN/GLOB SERPL: 1.5 {RATIO} (ref 1–2)
ALP LIVER SERPL-CCNC: 179 U/L
ALT SERPL-CCNC: 35 U/L
ANION GAP SERPL CALC-SCNC: 9 MMOL/L (ref 0–18)
ANTIBODY SCREEN: NEGATIVE
APPEARANCE: CLEAR
AST SERPL-CCNC: 56 U/L (ref ?–34)
BASOPHILS # BLD AUTO: 0.1 X10(3) UL (ref 0–0.2)
BASOPHILS NFR BLD AUTO: 0.9 %
BILIRUB SERPL-MCNC: 0.3 MG/DL (ref 0.3–1.2)
BUN BLD-MCNC: 11 MG/DL (ref 9–23)
BUN/CREAT SERPL: 14.3 (ref 10–20)
CALCIUM BLD-MCNC: 9.6 MG/DL (ref 8.7–10.4)
CHLORIDE SERPL-SCNC: 110 MMOL/L (ref 98–112)
CO2 SERPL-SCNC: 21 MMOL/L (ref 21–32)
CREAT BLD-MCNC: 0.77 MG/DL
CREAT UR-SCNC: 32.1 MG/DL
DEPRECATED RDW RBC AUTO: 41.4 FL (ref 35.1–46.3)
EGFRCR SERPLBLD CKD-EPI 2021: 102 ML/MIN/1.73M2 (ref 60–?)
EOSINOPHIL # BLD AUTO: 0.27 X10(3) UL (ref 0–0.7)
EOSINOPHIL NFR BLD AUTO: 2.5 %
ERYTHROCYTE [DISTWIDTH] IN BLOOD BY AUTOMATED COUNT: 13.8 % (ref 11–15)
FASTING STATUS PATIENT QL REPORTED: NO
GLOBULIN PLAS-MCNC: 2.6 G/DL (ref 2–3.5)
GLUCOSE (URINE DIPSTICK): NEGATIVE MG/DL
GLUCOSE BLD-MCNC: 103 MG/DL (ref 70–99)
HCT VFR BLD AUTO: 37.6 %
HGB BLD-MCNC: 13 G/DL
HIV 1+2 AB+HIV1 P24 AG SERPL QL IA: NONREACTIVE
IMM GRANULOCYTES # BLD AUTO: 0.14 X10(3) UL (ref 0–1)
IMM GRANULOCYTES NFR BLD: 1.3 %
LYMPHOCYTES # BLD AUTO: 2.27 X10(3) UL (ref 1–4)
LYMPHOCYTES NFR BLD AUTO: 20.8 %
MCH RBC QN AUTO: 28.6 PG (ref 26–34)
MCHC RBC AUTO-ENTMCNC: 34.6 G/DL (ref 31–37)
MCV RBC AUTO: 82.8 FL
MONOCYTES # BLD AUTO: 0.8 X10(3) UL (ref 0.1–1)
MONOCYTES NFR BLD AUTO: 7.3 %
MULTISTIX LOT#: NORMAL NUMERIC
NEUTROPHILS # BLD AUTO: 7.32 X10 (3) UL (ref 1.5–7.7)
NEUTROPHILS # BLD AUTO: 7.32 X10(3) UL (ref 1.5–7.7)
NEUTROPHILS NFR BLD AUTO: 67.2 %
NITRITE, URINE: NEGATIVE
OSMOLALITY SERPL CALC.SUM OF ELEC: 290 MOSM/KG (ref 275–295)
PLATELET # BLD AUTO: 280 10(3)UL (ref 150–450)
POTASSIUM SERPL-SCNC: 4.3 MMOL/L (ref 3.5–5.1)
PROT SERPL-MCNC: 6.5 G/DL (ref 5.7–8.2)
PROT UR-MCNC: 6.2 MG/DL (ref ?–14)
PROT/CREAT UR-RTO: 0.19
RBC # BLD AUTO: 4.54 X10(6)UL
RH BLOOD TYPE: POSITIVE
SODIUM SERPL-SCNC: 140 MMOL/L (ref 136–145)
T PALLIDUM AB SER QL IA: NONREACTIVE
URINE-COLOR: YELLOW
WBC # BLD AUTO: 10.9 X10(3) UL (ref 4–11)

## 2024-10-08 PROCEDURE — 90715 TDAP VACCINE 7 YRS/> IM: CPT | Performed by: OBSTETRICS & GYNECOLOGY

## 2024-10-08 PROCEDURE — 3E0P7VZ INTRODUCTION OF HORMONE INTO FEMALE REPRODUCTIVE, VIA NATURAL OR ARTIFICIAL OPENING: ICD-10-PCS | Performed by: OBSTETRICS & GYNECOLOGY

## 2024-10-08 PROCEDURE — 90471 IMMUNIZATION ADMIN: CPT | Performed by: OBSTETRICS & GYNECOLOGY

## 2024-10-08 PROCEDURE — 81002 URINALYSIS NONAUTO W/O SCOPE: CPT | Performed by: OBSTETRICS & GYNECOLOGY

## 2024-10-08 RX ORDER — LIDOCAINE HYDROCHLORIDE 10 MG/ML
30 INJECTION, SOLUTION EPIDURAL; INFILTRATION; INTRACAUDAL; PERINEURAL ONCE
Status: COMPLETED | OUTPATIENT
Start: 2024-10-08 | End: 2024-10-09

## 2024-10-08 RX ORDER — TERBUTALINE SULFATE 1 MG/ML
0.25 INJECTION, SOLUTION SUBCUTANEOUS AS NEEDED
Status: DISCONTINUED | OUTPATIENT
Start: 2024-10-08 | End: 2024-10-10 | Stop reason: HOSPADM

## 2024-10-08 RX ORDER — LABETALOL HYDROCHLORIDE 5 MG/ML
80 INJECTION, SOLUTION INTRAVENOUS ONCE AS NEEDED
Status: DISCONTINUED | OUTPATIENT
Start: 2024-10-08 | End: 2024-10-11

## 2024-10-08 RX ORDER — BETAMETHASONE SODIUM PHOSPHATE AND BETAMETHASONE ACETATE 3; 3 MG/ML; MG/ML
12 INJECTION, SUSPENSION INTRA-ARTICULAR; INTRALESIONAL; INTRAMUSCULAR; SOFT TISSUE EVERY 24 HOURS
Status: COMPLETED | OUTPATIENT
Start: 2024-10-08 | End: 2024-10-09

## 2024-10-08 RX ORDER — ZOLPIDEM TARTRATE 5 MG/1
5 TABLET ORAL NIGHTLY PRN
Status: DISCONTINUED | OUTPATIENT
Start: 2024-10-08 | End: 2024-10-11

## 2024-10-08 RX ORDER — ONDANSETRON 2 MG/ML
4 INJECTION INTRAMUSCULAR; INTRAVENOUS EVERY 6 HOURS PRN
Status: DISCONTINUED | OUTPATIENT
Start: 2024-10-08 | End: 2024-10-10 | Stop reason: HOSPADM

## 2024-10-08 RX ORDER — HYDROXYZINE HYDROCHLORIDE 50 MG/ML
50 INJECTION, SOLUTION INTRAMUSCULAR EVERY 6 HOURS PRN
Status: DISCONTINUED | OUTPATIENT
Start: 2024-10-08 | End: 2024-10-10 | Stop reason: HOSPADM

## 2024-10-08 RX ORDER — CHOLECALCIFEROL (VITAMIN D3) 25 MCG
1 TABLET,CHEWABLE ORAL DAILY
Status: DISCONTINUED | OUTPATIENT
Start: 2024-10-08 | End: 2024-10-11

## 2024-10-08 RX ORDER — LABETALOL HYDROCHLORIDE 5 MG/ML
20 INJECTION, SOLUTION INTRAVENOUS ONCE AS NEEDED
Status: COMPLETED | OUTPATIENT
Start: 2024-10-08 | End: 2024-10-08

## 2024-10-08 RX ORDER — ACETAMINOPHEN 500 MG
1000 TABLET ORAL EVERY 6 HOURS PRN
Status: DISCONTINUED | OUTPATIENT
Start: 2024-10-08 | End: 2024-10-11

## 2024-10-08 RX ORDER — HYDRALAZINE HYDROCHLORIDE 20 MG/ML
10 INJECTION INTRAMUSCULAR; INTRAVENOUS ONCE AS NEEDED
Status: DISCONTINUED | OUTPATIENT
Start: 2024-10-08 | End: 2024-10-11

## 2024-10-08 RX ORDER — IBUPROFEN 600 MG/1
600 TABLET, FILM COATED ORAL ONCE AS NEEDED
Status: COMPLETED | OUTPATIENT
Start: 2024-10-08 | End: 2024-10-09

## 2024-10-08 RX ORDER — ESCITALOPRAM OXALATE 5 MG/1
5 TABLET ORAL ONCE
Status: COMPLETED | OUTPATIENT
Start: 2024-10-08 | End: 2024-10-08

## 2024-10-08 RX ORDER — CALCIUM GLUCONATE 94 MG/ML
1 INJECTION, SOLUTION INTRAVENOUS ONCE AS NEEDED
Status: DISCONTINUED | OUTPATIENT
Start: 2024-10-08 | End: 2024-10-11

## 2024-10-08 RX ORDER — CITRIC ACID/SODIUM CITRATE 334-500MG
30 SOLUTION, ORAL ORAL AS NEEDED
Status: DISCONTINUED | OUTPATIENT
Start: 2024-10-08 | End: 2024-10-10 | Stop reason: HOSPADM

## 2024-10-08 RX ORDER — SODIUM CHLORIDE, SODIUM LACTATE, POTASSIUM CHLORIDE, CALCIUM CHLORIDE 600; 310; 30; 20 MG/100ML; MG/100ML; MG/100ML; MG/100ML
INJECTION, SOLUTION INTRAVENOUS AS NEEDED
Status: DISCONTINUED | OUTPATIENT
Start: 2024-10-08 | End: 2024-10-11

## 2024-10-08 RX ORDER — CALCIUM CARBONATE 500 MG/1
1000 TABLET, CHEWABLE ORAL
Status: DISCONTINUED | OUTPATIENT
Start: 2024-10-08 | End: 2024-10-11

## 2024-10-08 RX ORDER — SODIUM CHLORIDE, SODIUM LACTATE, POTASSIUM CHLORIDE, CALCIUM CHLORIDE 600; 310; 30; 20 MG/100ML; MG/100ML; MG/100ML; MG/100ML
INJECTION, SOLUTION INTRAVENOUS CONTINUOUS
Status: DISCONTINUED | OUTPATIENT
Start: 2024-10-08 | End: 2024-10-08

## 2024-10-08 RX ORDER — ASPIRIN 81 MG/1
81 TABLET ORAL DAILY
COMMUNITY

## 2024-10-08 RX ORDER — NALBUPHINE HYDROCHLORIDE 10 MG/ML
10 INJECTION INTRAMUSCULAR; INTRAVENOUS; SUBCUTANEOUS EVERY 6 HOURS PRN
Status: DISCONTINUED | OUTPATIENT
Start: 2024-10-08 | End: 2024-10-10 | Stop reason: HOSPADM

## 2024-10-08 RX ORDER — DEXTROSE, SODIUM CHLORIDE, SODIUM LACTATE, POTASSIUM CHLORIDE, AND CALCIUM CHLORIDE 5; .6; .31; .03; .02 G/100ML; G/100ML; G/100ML; G/100ML; G/100ML
INJECTION, SOLUTION INTRAVENOUS CONTINUOUS
Status: DISCONTINUED | OUTPATIENT
Start: 2024-10-08 | End: 2024-10-10 | Stop reason: HOSPADM

## 2024-10-08 RX ORDER — ACETAMINOPHEN 500 MG
500 TABLET ORAL EVERY 6 HOURS PRN
Status: DISCONTINUED | OUTPATIENT
Start: 2024-10-08 | End: 2024-10-11

## 2024-10-08 RX ORDER — DOCUSATE SODIUM 100 MG/1
100 CAPSULE, LIQUID FILLED ORAL 2 TIMES DAILY
Status: DISCONTINUED | OUTPATIENT
Start: 2024-10-08 | End: 2024-10-09

## 2024-10-08 RX ORDER — BETAMETHASONE SODIUM PHOSPHATE AND BETAMETHASONE ACETATE 3; 3 MG/ML; MG/ML
INJECTION, SUSPENSION INTRA-ARTICULAR; INTRALESIONAL; INTRAMUSCULAR; SOFT TISSUE
Status: COMPLETED
Start: 2024-10-08 | End: 2024-10-08

## 2024-10-08 RX ORDER — LABETALOL HYDROCHLORIDE 5 MG/ML
40 INJECTION, SOLUTION INTRAVENOUS ONCE AS NEEDED
Status: COMPLETED | OUTPATIENT
Start: 2024-10-08 | End: 2024-10-09

## 2024-10-08 NOTE — PROGRESS NOTES
Feeling well. No preE sx . BP elevated x 2.  To [facility] for preE work up.  Dr TALBERT on call notified.  NSTs scheduled.  Tdap today.  RTC 2 wks pending work up at [facility].  PL updated with last growth findings.

## 2024-10-09 ENCOUNTER — TELEPHONE (OUTPATIENT)
Dept: OBGYN CLINIC | Facility: CLINIC | Age: 39
End: 2024-10-09

## 2024-10-09 DIAGNOSIS — O99.210 OTHER OBESITY DUE TO EXCESS CALORIES AFFECTING PREGNANCY, ANTEPARTUM (HCC): ICD-10-CM

## 2024-10-09 DIAGNOSIS — O09.511 PRIMIGRAVIDA OF ADVANCED MATERNAL AGE IN FIRST TRIMESTER (HCC): ICD-10-CM

## 2024-10-09 DIAGNOSIS — E66.09 OTHER OBESITY DUE TO EXCESS CALORIES AFFECTING PREGNANCY, ANTEPARTUM (HCC): ICD-10-CM

## 2024-10-09 PROBLEM — O14.13 SEVERE PRE-ECLAMPSIA IN THIRD TRIMESTER (HCC): Status: ACTIVE | Noted: 2024-10-09

## 2024-10-09 LAB
ALBUMIN SERPL-MCNC: 4 G/DL (ref 3.2–4.8)
ALBUMIN/GLOB SERPL: 1.6 {RATIO} (ref 1–2)
ALP LIVER SERPL-CCNC: 179 U/L
ALT SERPL-CCNC: 32 U/L
ANION GAP SERPL CALC-SCNC: 10 MMOL/L (ref 0–18)
AST SERPL-CCNC: 40 U/L (ref ?–34)
BILIRUB SERPL-MCNC: 0.3 MG/DL (ref 0.3–1.2)
BUN BLD-MCNC: 9 MG/DL (ref 9–23)
BUN/CREAT SERPL: 13 (ref 10–20)
CALCIUM BLD-MCNC: 9.2 MG/DL (ref 8.7–10.4)
CHLORIDE SERPL-SCNC: 106 MMOL/L (ref 98–112)
CO2 SERPL-SCNC: 20 MMOL/L (ref 21–32)
CREAT BLD-MCNC: 0.69 MG/DL
EGFRCR SERPLBLD CKD-EPI 2021: 115 ML/MIN/1.73M2 (ref 60–?)
FASTING STATUS PATIENT QL REPORTED: YES
GLOBULIN PLAS-MCNC: 2.5 G/DL (ref 2–3.5)
GLUCOSE BLD-MCNC: 107 MG/DL (ref 70–99)
MAGNESIUM SERPL-MCNC: 4.1 MG/DL (ref 4.8–8.4)
MAGNESIUM SERPL-MCNC: 4.9 MG/DL (ref 4.8–8.4)
OSMOLALITY SERPL CALC.SUM OF ELEC: 281 MOSM/KG (ref 275–295)
POTASSIUM SERPL-SCNC: 4.5 MMOL/L (ref 3.5–5.1)
PROT SERPL-MCNC: 6.5 G/DL (ref 5.7–8.2)
SODIUM SERPL-SCNC: 136 MMOL/L (ref 136–145)

## 2024-10-09 PROCEDURE — 3E033VJ INTRODUCTION OF OTHER HORMONE INTO PERIPHERAL VEIN, PERCUTANEOUS APPROACH: ICD-10-PCS | Performed by: OBSTETRICS & GYNECOLOGY

## 2024-10-09 PROCEDURE — 59400 OBSTETRICAL CARE: CPT | Performed by: OBSTETRICS & GYNECOLOGY

## 2024-10-09 PROCEDURE — 0KQM0ZZ REPAIR PERINEUM MUSCLE, OPEN APPROACH: ICD-10-PCS | Performed by: OBSTETRICS & GYNECOLOGY

## 2024-10-09 RX ORDER — AMMONIA INHALANTS 0.04 G/.3ML
0.3 INHALANT RESPIRATORY (INHALATION) AS NEEDED
Status: DISCONTINUED | OUTPATIENT
Start: 2024-10-09 | End: 2024-10-11

## 2024-10-09 RX ORDER — ESCITALOPRAM OXALATE 10 MG/1
20 TABLET ORAL DAILY
Status: DISCONTINUED | OUTPATIENT
Start: 2024-10-09 | End: 2024-10-11

## 2024-10-09 RX ORDER — FAMOTIDINE 20 MG/1
20 TABLET, FILM COATED ORAL 2 TIMES DAILY
Status: DISCONTINUED | OUTPATIENT
Start: 2024-10-09 | End: 2024-10-11

## 2024-10-09 RX ORDER — ESCITALOPRAM OXALATE 10 MG/1
20 TABLET ORAL DAILY
Status: CANCELLED | OUTPATIENT
Start: 2024-10-09

## 2024-10-09 RX ORDER — NALBUPHINE HYDROCHLORIDE 10 MG/ML
2.5 INJECTION INTRAMUSCULAR; INTRAVENOUS; SUBCUTANEOUS
Status: DISCONTINUED | OUTPATIENT
Start: 2024-10-09 | End: 2024-10-11

## 2024-10-09 RX ORDER — ACETAMINOPHEN 500 MG
1000 TABLET ORAL EVERY 6 HOURS PRN
Status: DISCONTINUED | OUTPATIENT
Start: 2024-10-09 | End: 2024-10-09

## 2024-10-09 RX ORDER — BUPIVACAINE HYDROCHLORIDE 2.5 MG/ML
20 INJECTION, SOLUTION EPIDURAL; INFILTRATION; INTRACAUDAL ONCE
Status: DISCONTINUED | OUTPATIENT
Start: 2024-10-09 | End: 2024-10-10 | Stop reason: HOSPADM

## 2024-10-09 RX ORDER — LISDEXAMFETAMINE DIMESYLATE 50 MG/1
50 CAPSULE ORAL EVERY MORNING
Status: CANCELLED | OUTPATIENT
Start: 2024-10-10

## 2024-10-09 RX ORDER — ACETAMINOPHEN 500 MG
500 TABLET ORAL EVERY 6 HOURS PRN
Status: DISCONTINUED | OUTPATIENT
Start: 2024-10-09 | End: 2024-10-09

## 2024-10-09 RX ORDER — ESCITALOPRAM OXALATE 5 MG/1
5 TABLET ORAL DAILY
Status: CANCELLED | OUTPATIENT
Start: 2024-10-09

## 2024-10-09 RX ORDER — ALBUTEROL SULFATE 90 UG/1
1 INHALANT RESPIRATORY (INHALATION) EVERY 6 HOURS PRN
Status: CANCELLED | OUTPATIENT
Start: 2024-10-09

## 2024-10-09 RX ORDER — BUPIVACAINE HCL/0.9 % NACL/PF 0.25 %
5 PLASTIC BAG, INJECTION (ML) EPIDURAL AS NEEDED
Status: DISCONTINUED | OUTPATIENT
Start: 2024-10-09 | End: 2024-10-11

## 2024-10-09 RX ORDER — BISACODYL 10 MG
10 SUPPOSITORY, RECTAL RECTAL ONCE AS NEEDED
Status: DISCONTINUED | OUTPATIENT
Start: 2024-10-09 | End: 2024-10-11

## 2024-10-09 RX ORDER — ENOXAPARIN SODIUM 100 MG/ML
40 INJECTION SUBCUTANEOUS DAILY
Status: DISCONTINUED | OUTPATIENT
Start: 2024-10-10 | End: 2024-10-11

## 2024-10-09 RX ORDER — ESCITALOPRAM OXALATE 5 MG/1
5 TABLET ORAL NIGHTLY
Status: DISCONTINUED | OUTPATIENT
Start: 2024-10-09 | End: 2024-10-11

## 2024-10-09 RX ORDER — SIMETHICONE 80 MG
80 TABLET,CHEWABLE ORAL 3 TIMES DAILY PRN
Status: DISCONTINUED | OUTPATIENT
Start: 2024-10-09 | End: 2024-10-11

## 2024-10-09 RX ORDER — FAMOTIDINE 20 MG/1
20 TABLET, FILM COATED ORAL 2 TIMES DAILY
Status: CANCELLED | OUTPATIENT
Start: 2024-10-10

## 2024-10-09 RX ORDER — FAMOTIDINE 10 MG/ML
20 INJECTION, SOLUTION INTRAVENOUS ONCE
Status: COMPLETED | OUTPATIENT
Start: 2024-10-09 | End: 2024-10-09

## 2024-10-09 RX ORDER — DOCUSATE SODIUM 100 MG/1
100 CAPSULE, LIQUID FILLED ORAL
Status: DISCONTINUED | OUTPATIENT
Start: 2024-10-09 | End: 2024-10-11

## 2024-10-09 RX ORDER — IBUPROFEN 600 MG/1
600 TABLET, FILM COATED ORAL EVERY 6 HOURS
Status: DISCONTINUED | OUTPATIENT
Start: 2024-10-09 | End: 2024-10-11

## 2024-10-09 NOTE — H&P
[facility]  part of [facility]    History & Physical    Horace Otero Patient Status:  Outpatient    1985 MRN [id number]   Location [facility] Attending Jacky Mendoza, DO   Hosp Day # 0 PCP Jamilah Moran MD     Date of Admission:  10/8/2024      HPI:   Horace Otreo is a 39 year old  female, current EGA of 34w5d with an estimated date of delivery of: 2024, by Ultrasound      Horace Otero is being admitted for observation.    Her current obstetrical history is significant for AMA, high BMI, uterine fibroids, ADD on meds, Anxiety / depression on meds and 2-vessel cord.  She is Rh positive, rubella immune, serology negative. She had low risk NIPT- female, normal level 2 and then growth at 44% on . Seen for routine visit with Dr. Velasco at 34w5d. Elevated BP and sent to triage. All initial pressures were elevated and labs normal except for AST = 56.   Patient reports no complaints .  She denies HA, scotomata or persistent edema after elevating extremities.  Six pound gain in 2 weeks and 34 lbs total.   Fetal Movement: good    History   Obstetric History:   OB History    Para Term  AB Living   1 0 0 0 0 0   SAB IAB Ectopic Multiple Live Births   0 0 0 0 0      # Outcome Date GA Lbr Danie/2nd Weight Sex Type Anes PTL Lv   1 Current              Past Medical History:   Past Medical History:    ADHD    Alopecia    Anxiety    Depression    Migraines     Past Surgerical History:   Past Surgical History:   Procedure Laterality Date    Eye surgery       Social History:   Social History     Tobacco Use    Smoking status: Never    Smokeless tobacco: Never   Substance Use Topics    Alcohol use: Not Currently     Comment: 2-3 drinks weekly        Allergies/Medications:   Allergies:   No Known Allergies  Medications:  Medications Prior to Admission   Medication Sig Dispense Refill Last Dose    aspirin 81 MG Oral Tab EC Take 1 tablet (81  mg total) by mouth daily.   10/8/2024    escitalopram 5 MG Oral Tab Take 1 tablet (5 mg total) by mouth daily. Along w 20 mg for total 25 mg daily. 90 tablet 1 10/7/2024    FAMOTIDINE 20 MG Oral Tab TAKE 1 TABLET BY MOUTH TWICE A DAY 60 tablet 1 10/8/2024    loratadine-pseudoephedrine ER (CLARITIN-D 24 HOUR)  MG Oral Tablet 24 Hr Take 1 tablet by mouth daily.   10/7/2024    lisdexamfetamine (VYVANSE) 50 MG Oral Cap Take 1 capsule (50 mg total) by mouth every morning. 30 capsule 0 10/7/2024    prenatal vitamin with DHA 27-0.8-228 MG Oral Cap Take 1 capsule by mouth daily.   10/8/2024    escitalopram 20 MG Oral Tab Take 1 tablet (20 mg total) by mouth daily. 90 tablet 2 10/8/2024    albuterol 108 (90 Base) MCG/ACT Inhalation Aero Soln Inhale 1 puff into the lungs every 6 (six) hours as needed for Shortness of Breath. 1 each 1 Past Month    [] lisdexamfetamine (VYVANSE) 50 MG Oral Cap Take 1 capsule (50 mg total) by mouth daily. 30 capsule 0        Review of Systems:   As documented in HPI      Physical Exam:   Temp:  [97.9 °F (36.6 °C)-98 °F (36.7 °C)] 98 °F (36.7 °C)  Pulse:  [] 88  Resp:  [16-20] 16  BP: (123-162)/() 156/97    Constitutional: alert and cooperative in No distress  Abdomen: gravid nontender  Vaginal exam:  Not indicated    FHT assessment:   Baseline: 135 bpm   Variability: moderate   Accels:  present   Decels: No   Tocos:  No   Category: 1 tracing    Results:     Type / screen pending  H/H = 13.0g / 37% and platelets = 280K  AST- 56 and ALT- 35  P/C = 0.19  Trep / HIV NR       Assessment/Plan:     Gestational hypertension, third trimester (HCC)           Not in labor.    Treatment Plan:  Intervention: Admit, serial BP, 24 hour urine for protein, steroids x 2 and repeat CMP in AM. BP's initially improved during afternoon after admission but then increased by evening. Plan Maternal Fetal Medicine consultation and ultrasound will be up to consultant. She is not due for growth  until the 23rd.     Jacky Mendoza,   10/8/2024  9:00 PM

## 2024-10-10 LAB
ALBUMIN SERPL-MCNC: 3.7 G/DL (ref 3.2–4.8)
ALBUMIN/GLOB SERPL: 1.7 {RATIO} (ref 1–2)
ALP LIVER SERPL-CCNC: 151 U/L
ALT SERPL-CCNC: 26 U/L
ANION GAP SERPL CALC-SCNC: 10 MMOL/L (ref 0–18)
AST SERPL-CCNC: 32 U/L (ref ?–34)
BASOPHILS # BLD AUTO: 0.06 X10(3) UL (ref 0–0.2)
BASOPHILS NFR BLD AUTO: 0.3 %
BILIRUB SERPL-MCNC: 0.2 MG/DL (ref 0.3–1.2)
BUN BLD-MCNC: 10 MG/DL (ref 9–23)
BUN/CREAT SERPL: 13.5 (ref 10–20)
CALCIUM BLD-MCNC: 8 MG/DL (ref 8.7–10.4)
CHLORIDE SERPL-SCNC: 107 MMOL/L (ref 98–112)
CO2 SERPL-SCNC: 21 MMOL/L (ref 21–32)
CREAT BLD-MCNC: 0.74 MG/DL
DEPRECATED RDW RBC AUTO: 46.1 FL (ref 35.1–46.3)
EGFRCR SERPLBLD CKD-EPI 2021: 107 ML/MIN/1.73M2 (ref 60–?)
EOSINOPHIL # BLD AUTO: 0 X10(3) UL (ref 0–0.7)
EOSINOPHIL NFR BLD AUTO: 0 %
ERYTHROCYTE [DISTWIDTH] IN BLOOD BY AUTOMATED COUNT: 14.4 % (ref 11–15)
GLOBULIN PLAS-MCNC: 2.2 G/DL (ref 2–3.5)
GLUCOSE BLD-MCNC: 130 MG/DL (ref 70–99)
HCT VFR BLD AUTO: 31.9 %
HGB BLD-MCNC: 10.3 G/DL
IMM GRANULOCYTES # BLD AUTO: 0.3 X10(3) UL (ref 0–1)
IMM GRANULOCYTES NFR BLD: 1.5 %
LYMPHOCYTES # BLD AUTO: 1.91 X10(3) UL (ref 1–4)
LYMPHOCYTES NFR BLD AUTO: 9.6 %
MAGNESIUM SERPL-MCNC: 5 MG/DL (ref 4.8–8.4)
MAGNESIUM SERPL-MCNC: 5 MG/DL (ref 4.8–8.4)
MAGNESIUM SERPL-MCNC: 5.7 MG/DL (ref 4.8–8.4)
MAGNESIUM SERPL-MCNC: 5.8 MG/DL (ref 4.8–8.4)
MCH RBC QN AUTO: 28.5 PG (ref 26–34)
MCHC RBC AUTO-ENTMCNC: 32.3 G/DL (ref 31–37)
MCV RBC AUTO: 88.4 FL
MONOCYTES # BLD AUTO: 1.74 X10(3) UL (ref 0.1–1)
MONOCYTES NFR BLD AUTO: 8.7 %
NEUTROPHILS # BLD AUTO: 15.94 X10 (3) UL (ref 1.5–7.7)
NEUTROPHILS # BLD AUTO: 15.94 X10(3) UL (ref 1.5–7.7)
NEUTROPHILS NFR BLD AUTO: 79.9 %
OSMOLALITY SERPL CALC.SUM OF ELEC: 287 MOSM/KG (ref 275–295)
PLATELET # BLD AUTO: 276 10(3)UL (ref 150–450)
POTASSIUM SERPL-SCNC: 4.2 MMOL/L (ref 3.5–5.1)
PROT SERPL-MCNC: 5.9 G/DL (ref 5.7–8.2)
RBC # BLD AUTO: 3.61 X10(6)UL
SODIUM SERPL-SCNC: 138 MMOL/L (ref 136–145)
WBC # BLD AUTO: 20 X10(3) UL (ref 4–11)

## 2024-10-10 RX ORDER — NIFEDIPINE 30 MG/1
30 TABLET, EXTENDED RELEASE ORAL DAILY
Status: DISCONTINUED | OUTPATIENT
Start: 2024-10-10 | End: 2024-10-11

## 2024-10-10 NOTE — PROGRESS NOTES
Pt given breast pump and offered education on how to use it. Denies pumping education at this time, stating desire to see lactation consultant. Pt given manual pump and encouraged to use it until lactation able to assist. Lactation consultant Horace notified of the request.

## 2024-10-10 NOTE — DISCHARGE SUMMARY
[facility]  part of [facility]    Discharge Summary    Horace Otero Patient Status:  Inpatient    1985 MRN [id number]   Location [facility] Attending Jacky Mendoza DO   Hosp Day # 0       Admit date:  10/8/2024    Discharge date: 10/11/2024    Delivering OB Clinician: Gregorio    EDC: Estimated Date of Delivery: 24    Gestational Age: 34w6d    Antepartum complications:   Patient Active Problem List   Diagnosis    Attention deficit disorder    Anxiety and depression    Uterine fibroid during pregnancy, antepartum ([facility])    Anxiety disorder affecting pregnancy, antepartum ([facility])    Primigravida of advanced maternal age in first trimester ([facility])    BMI 33.9    Raynaud's syndrome    At risk for postpartum depression    Pregnancy ([facility])    Gestational hypertension, third trimester ([facility])    Severe pre-eclampsia in third trimester ([facility])       Date of Delivery: 10/9/2024 Time of Delivery: 6:06 PM    Delivery Type: spontaneous vaginal delivery    Baby: Liveborn female   Information for the patient's :  Shanna Girl [[id number]]   5 lb 7.8 oz (2.49 kg)  Apgars:  1 minute: 5  5 minutes: 810 minutes:       Intrapartum Complications: preeclampsia      Hospital Course: cytotec x 2 doses. Mag. SROM. Pitocen. Pushed less than 15 min. Live female in toto. Wt 5# 8 oz at 1806. 2nd degree perineal / sulcus under local anesth repaired. Mag x 24 hours PP. Nifedipine XL 30 mg.  No complications. Routine delivery and postpartum care  Patient Vitals for the past 36 hrs:   Dilation Dilation Complete Date Dilation Complete Time Effacement (%) Station Cervical Characteristics Presentation Method OB Examiner Station (Labor Curve)   10/09/24 1750 10 10/09/24 1850 100 2 -- Vertex -- A Jed RN 3 cm   10/09/24 1713 3.5 -- -- 90 -2 Posterior Vertex Manual A Jed RN 7 cm   10/09/24 1337 1.5 -- -- 70 -3 -- Vertex Manual A Jed RN 8 cm   10/09/24 0651 1 -- -- 50 -3 -- Vertex  Anish Peter RN 8 cm   10/09/24 0028 0 -- -- 0 -4 -- Vertex Anish Peter RN 9 cm          Discharged Condition: stable    Disposition: home    Plan:     Follow-up appointment in 3-4 days for BP check & then 6 weeks with Dr. Perkins

## 2024-10-10 NOTE — TELEPHONE ENCOUNTER
Upcoming OB appointments canceled. We will reach out to patient on Saturday for BP check follow up.

## 2024-10-10 NOTE — LACTATION NOTE
LACTATION NOTE - MOTHER      Evaluation Type: Inpatient    Problems identified  Problems identified: Knowledge deficit;Unable to acheive sustained latch    Maternal history  Maternal history: AMA;Induction of labor;PIH;Obesity;Anxiety;Depression    Breastfeeding goal  Breastfeeding goal: To maintain breast milk feeding per patient goal    Maternal Assessment  Bilateral Breasts: Soft;Pendulous;Symmetrical  Bilateral Nipples: WNL  Prior breastfeeding experience (comment below): Primip  Breastfeeding Assistance: Pumping assistance provided with permission    Pain assessment  Location/Comment: denies    Guidelines for use of:  Breast pump type: Padmini [place]  Suggested use of pump: Pump 8-12X/24hr;Pump each time a supplement is offered;Pump if infant is not latching to breast  Other (comment): LC assisted with pumping. discussed pumping frequency, duration, techniques to increase milk supply, cleaning, hospital grade vs consumer pumps. 8mL pumped and drawn up into syringes & labeled fro SCN. SCN packet to be brought to patient.

## 2024-10-10 NOTE — L&D DELIVERY NOTE
Shanna, Girl [[id number]]      Labor Events     labor?: Yes   steroids?: Full Course  Antibiotics received during labor?: Yes  Antibiotics (enter # doses in comment): ampicillin (Comment: 3)  Rupture date/time: 10/9/2024 0640     Rupture type: SROM  Fluid color: Clear  Labor type: Induced Onset of Labor  Induction: Misoprostol  Indications for induction: Preeclampsia  Intrapartum & labor complications: None       Labor Length    1st stage: 11h 10m  2nd stage: 0h 16m  3rd stage: 0h 04m       Labor Event Times    Labor onset date/time: 10/9/2024 0640  Dilation complete date/time: 10/9/2024 1750  Start pushing date/time: 10/9/2024 1800       [place] Presentation    Presentation: Vertex  Position: Occiput Transverse       Operative Delivery    Operative Vaginal Delivery: No                Shoulder Dystocia    Shoulder Dystocia: No       Anesthesia    Method: Local   Analgesics:  Analgesics   FENTANYL (BULK CHEMICALS - F'S)   NALBUPHINE HCL 10 MG/ML IJ SOLN   HYDROXYZINE HCL 50 MG/ML IM SOLN              Delivery      Delivery date/time:  10/9/24 18:06:38   Delivery type: Normal spontaneous vaginal delivery    Details:     Delivery location: delivery room  Delivery Room Temperature: 70       Delivery Providers    Delivering Clinician: Apryl Perkins MD   Delivery personnel:  Provider Role   Antonio Bland, RN Baby Nurse   Mar Adkins RN Delivery Nurse   Jayleen Dias MD Neonatologist   Patrizia Parker, P Respiratory Therapist   Alejandra De Leon Surgical Tech             Cord    Vessels: 3 Vessels  Complications: None  Timed cord clamping: Yes  Time in sec: 30  Cord blood disposition: to lab  Gases sent?: Yes       Resuscitation    Method: Oxygen, Suctioning       [place] Measurements      Weight: 2490 g 5 lb 7.8 oz Length: 45.5 cm     Head circum.: 33 cm              Placenta    Date/time: 10/9/2024 1811  Removal: Spontaneous  Appearance: Intact  Disposition: Lab       Apgars     Living status: Living   Apgar Scoring Key:    0 1 2    Skin color Blue or pale Acrocyanotic Completely pink    Heart rate Absent <100 bpm >100 bpm    Reflex irritability No response Grimace Cry or active withdrawal    Muscle tone Limp Some flexion Active motion    Respiratory effort Absent Weak cry; hypoventilation Good, crying              1 Minute:  5 Minute:  10 Minute:  15 Minute:  20 Minute:      Skin color: 0  1       Heart rate: 2  2       Reflex irritablity: 1  1       Muscle tone: 1  2       Respiratory effort: 1  2       Total: 5  8          Apgars assigned by: DR PAK  Siler disposition: NICU       Skin to Skin    Reason skin to skin not initiated:  Acuity       Vaginal Count    Initial count RN: Mar Adkins RN  Initial count Tech: De Leon, Alejandra   Sponges   Sharps    Initial counts 10   0    Final counts 20   3    Final count RN: Mar Adkins RN  Final count MD: Apryl Perkins MD       Lacerations    Episiotomy: None  Perineal lacerations: 2nd Repaired?: Yes     Sulcus laceration: right      Vaginal laceration?: No      Cervical laceration?: No    Clitoral laceration?: No    Estimated blood loss (mL): 200            [facility]  part of [facility]    Vaginal Delivery Note    Horace Otero Patient Status:  Inpatient    1985 MRN [id number]   Location [facility] Attending Jacky Mendoza, DO   Hosp Day # 0 PCP Jamilah Moran MD     Delivery     Infant Info:  Date of Delivery: 10/9/2024, Time of Delivery: 6:06 PM, Delivery Type: Normal spontaneous vaginal delivery    Information for the patient's :  Shanna, Girl [[id number]]   5 lb 7.8 oz (2.49 kg)    Apgars:    1 minute: 5               5 minutes: 8                      10 minutes:       Delivery Narrative: Patient pushed for 15  minutes prior to delivering a live female infant over intact perineum in  OA  position in toto. No nuchal cord noted. Infant was bulb  suctioned prior to delivering in toto. Cord doubly clamped & cut after few seconds due to floppy infant & requested by tania. Infant handed to awaiting neonatalogist. Second degree midline perineal laceration & right sulucus repaired with 2-0 Vicryl in normal usual fashion. No periuretheral, nor cervical lacerations noted. Placenta delivered spontaneously, intact and normal in appearance with 3 vessel cord.  Mother in stable condition.    Maternal Anesthesia: epidural     Delivery Complications:  none    Neonatologist Present: yes    Placenta info:  Date/Time of Delivery: 10/9/2024  6:11 PM   Delivery: spontaneous  Placenta to Pathology: yes    Cord info:  Cord Gases Submitted: yes  Cord Blood Collection: no  Cord Tissue Collection: no  Cord Complications: none    Sponge and Needle Counts:  Verified    Quantitative Blood Loss (mL)        Apryl Perkins MD   10/9/2024  9:59 PM

## 2024-10-10 NOTE — H&P
[facility]  part of [facility]    OB/Gyne Post  Progress Note      Horace Otero Patient Status:  Inpatient    1985 MRN [id number]   Location [facility] 3SE Attending Jacky Mendoza, DO   Hosp Day # 2 PCP Jamilah Moran MD       Subjective     Good pain control. Tolerating present diet. Still on bedrest. Barrientos in place.      She denies fevers, chills, headache, blurry vision, chest pain, SOB, RUQ pain, n/v, dizziness upon ambulation nor leg pain.     Objective   Vital signs in last 24 hours:  Temp:  [98.1 °F (36.7 °C)-98.2 °F (36.8 °C)] 98.1 °F (36.7 °C)  Pulse:  [] 88  Resp:  [16] 16  BP: (122-176)/(67-98) 129/91  SpO2:  [93 %-100 %] 95 %    Input/Output:    Intake/Output Summary (Last 24 hours) at 10/10/2024 1506  Last data filed at 10/10/2024 1245  Gross per 24 hour   Intake 1087.5 ml   Output 4215 ml   Net -3127.5 ml       AVSS  Constitutional: comfortable  Abdomen: soft nontender  Uterus: fundus below umbilicus, non tender  Extremities: No calf tenderness, neg homans Foley present      Results:   Labs / Path / Radiology:    Recent Results (from the past 24 hours)   Magnesium Sulfate Therapy    Collection Time: 10/10/24 12:10 AM   Result Value Ref Range    MG Sulfate Therapy 5.7 4.8 - 8.4 mg/dL   Magnesium Sulfate Therapy    Collection Time: 10/10/24  6:25 AM   Result Value Ref Range    MG Sulfate Therapy 5.8 4.8 - 8.4 mg/dL   Comp Metabolic Panel (14)    Collection Time: 10/10/24  6:25 AM   Result Value Ref Range    Glucose 130 (H) 70 - 99 mg/dL    Sodium 138 136 - 145 mmol/L    Potassium 4.2 3.5 - 5.1 mmol/L    Chloride 107 98 - 112 mmol/L    CO2 21.0 21.0 - 32.0 mmol/L    Anion Gap 10 0 - 18 mmol/L    BUN 10 9 - 23 mg/dL    Creatinine 0.74 0.55 - 1.02 mg/dL    BUN/CREA Ratio 13.5 10.0 - 20.0    Calcium, Total 8.0 (L) 8.7 - 10.4 mg/dL    Calculated Osmolality 287 275 - 295 mOsm/kg    eGFR-Cr 107 >=60 mL/min/1.73m2    ALT 26 10 - 49 U/L    AST 32 <34 U/L     Alkaline Phosphatase 151 (H) 37 - 98 U/L    Bilirubin, Total 0.2 (L) 0.3 - 1.2 mg/dL    Total Protein 5.9 5.7 - 8.2 g/dL    Albumin 3.7 3.2 - 4.8 g/dL    Globulin  2.2 2.0 - 3.5 g/dL    A/G Ratio 1.7 1.0 - 2.0   CBC With Differential With Platelet    Collection Time: 10/10/24  6:58 AM   Result Value Ref Range    WBC 20.0 (H) 4.0 - 11.0 x10(3) uL    RBC 3.61 (L) 3.80 - 5.30 x10(6)uL    HGB 10.3 (L) 12.0 - 16.0 g/dL    HCT 31.9 (L) 35.0 - 48.0 %    MCV 88.4 80.0 - 100.0 fL    MCH 28.5 26.0 - 34.0 pg    MCHC 32.3 31.0 - 37.0 g/dL    RDW-SD 46.1 35.1 - 46.3 fL    RDW 14.4 11.0 - 15.0 %    .0 150.0 - 450.0 10(3)uL    Neutrophil Absolute Prelim 15.94 (H) 1.50 - 7.70 x10 (3) uL    Neutrophil Absolute 15.94 (H) 1.50 - 7.70 x10(3) uL    Lymphocyte Absolute 1.91 1.00 - 4.00 x10(3) uL    Monocyte Absolute 1.74 (H) 0.10 - 1.00 x10(3) uL    Eosinophil Absolute 0.00 0.00 - 0.70 x10(3) uL    Basophil Absolute 0.06 0.00 - 0.20 x10(3) uL    Immature Granulocyte Absolute 0.30 0.00 - 1.00 x10(3) uL    Neutrophil % 79.9 %    Lymphocyte % 9.6 %    Monocyte % 8.7 %    Eosinophil % 0.0 %    Basophil % 0.3 %    Immature Granulocyte % 1.5 %   Magnesium Sulfate Therapy    Collection Time: 10/10/24 12:01 PM   Result Value Ref Range    MG Sulfate Therapy 5.0 4.8 - 8.4 mg/dL       Specimens (From admission, onward)      None            No results found.      Assessment/Plan   39 year oldyo  , s/p spontaneous vaginal, PPD# 1     Patient Active Problem List   Diagnosis    Attention deficit disorder    Anxiety and depression    Uterine fibroid during pregnancy, antepartum ([facility])    Anxiety disorder affecting pregnancy, antepartum ([facility])    Primigravida of advanced maternal age in first trimester ([facility])    BMI 33.9    Raynaud's syndrome    At risk for postpartum depression    Pregnancy ([facility])    Gestational hypertension, third trimester ([facility])    Severe pre-eclampsia in third trimester ([facility])     Doing well; stable  BPs normal range to mild  range; will CTM closely and use PO antiHTN meds if persistent mild range pressures  Mag off at 6pm today  AST wnl today  Anticipate home tomorrow   Continue home meds; mood good per pt      Ranjit Velasco,   10/10/2024  3:06 PM

## 2024-10-10 NOTE — TELEPHONE ENCOUNTER
Patient delivered 10/9/2024 Please cancel upcoming appointments. S/p Mag for pre-eclampsia. Will need BP check in office once discharged

## 2024-10-11 VITALS
TEMPERATURE: 98 F | DIASTOLIC BLOOD PRESSURE: 77 MMHG | HEART RATE: 82 BPM | SYSTOLIC BLOOD PRESSURE: 135 MMHG | RESPIRATION RATE: 16 BRPM | OXYGEN SATURATION: 96 %

## 2024-10-11 PROBLEM — Z34.90 PREGNANCY (HCC): Status: RESOLVED | Noted: 2024-10-08 | Resolved: 2024-10-11

## 2024-10-11 RX ORDER — IBUPROFEN 600 MG/1
600 TABLET, FILM COATED ORAL EVERY 6 HOURS
Qty: 30 TABLET | Refills: 0 | Status: SHIPPED | OUTPATIENT
Start: 2024-10-11

## 2024-10-11 RX ORDER — NIFEDIPINE 30 MG
30 TABLET, EXTENDED RELEASE ORAL DAILY
Qty: 30 TABLET | Refills: 2 | Status: SHIPPED | OUTPATIENT
Start: 2024-10-12

## 2024-10-11 NOTE — PLAN OF CARE
Discharge Note  Discharge order received from MD. IV removed. Aware of follow up appointments. Discussed what to expect after discharge and when to call physician. went over discharge AVS with patient. Patient states understanding. Pt aware of pelvic rest for 6 weeks.  Prescriptions were sent to pharmacy.     Aware to check bp twice a day and to notify MD if any SBP >160    Educated about medications and when to take them.     Electronically sent prescriptions: motrin/ nifedipine

## 2024-10-11 NOTE — LACTATION NOTE
LACTATION NOTE - MOTHER      Evaluation Type: Inpatient    Problems identified  Problems identified: Knowledge deficit;Milk supply not WNL  Milk supply not WNL: Reduced (potential)  Problems Identified Other: baby 35 wks in scn    Maternal history  Maternal history: AMA;Induction of labor;PIH;Obesity;Anxiety;Depression    Breastfeeding goal  Breastfeeding goal: To maintain breast milk feeding per patient goal    Maternal Assessment  Bilateral Breasts: Soft;Pendulous;Symmetrical  Bilateral Nipples: WNL  Prior breastfeeding experience (comment below): Primip  Breastfeeding Assistance: Pumping assistance provided with permission    Pain assessment  Location/Comment: denies  Treatment of Sore Nipples: Expressed breast milk;Lanolin    Guidelines for use of:  Equipment: Lanolin  Breast pump type: Ameda Platinum;Spectra;Hand Pump  Suggested use of pump: Pump 8-12X/24hr              Infant in [facility]. Discussed pump settings, assembly, and proper flange size.  Educated patient about supply/demand and the importance of frequent stimulation. Encouraged to call LC if assistance with breastfeeding is needed.    Going home today, discussed pumping schedule for home, and encouraged to come in for a outpt appt after baby is discharged, patient sized down to a 21 flange, and discussed how sizing can change, and what to look for, for a good fit.  Patient got 20 ml from last pump session

## 2024-10-11 NOTE — DISCHARGE INSTRUCTIONS
-Pelvic rest for 6 weeks; no sex, tampons, douching, baths, or pools until after 6 weeks check-up.  -No heavy lifting; increase activity gradually.  -No driving if taking narcotics.    CALL YOUR PROVIDER IF:  Increased/heavy bleeding (I.e. Changing a saturated pad every hour).  New onset chills/fever greater than 100.4.  Pain in your vagina or abdomen that gets worse and isn't relieved with medicine.  Swelling or discharge with a bad odor from vagina.  Burning, pain, red streaks, or lumpy areas in your breasts that may be accompanied by flu-like symptoms.  Painful urination, or inability to control urination.  Nausea, vomiting, dizziness, or fainting.  Feelings of extreme sadness or anxiety, or a feeling that you don’t want to be with your baby.  Redness, warmth, or pain in the lower leg.  Chest pain or shortness of breath.  If : Check incision site AT LEAST 2x daily for signs and symptoms of infection (increased redness, warmth, tenderness, or drainage)    Mom & Baby Hour: Meets in person every WEDNESDAY at 10am at the [facility] in [place] ([address]) in the community education room. The group is for new moms and their babies up to 6 months of age. It includes breastfeeding supports with a certified lactation educator to be available to answer your breastfeeding questions.

## 2024-10-11 NOTE — PROGRESS NOTES
[facility]  part of [facility]    Post  Progress Note      Horace Otero Patient Status:  Inpatient    1985 MRN [id number]   Location [facility] 3SE Attending Jacky Mendoza, DO   Hosp Day # 3 PCP Jamilah Moran MD       Subjective     Good pain control.  No c/o.  Nifedipine started last night.   Doing well.    Objective   Vital signs in last 24 hours:  Temp:  [98.1 °F (36.7 °C)-98.8 °F (37.1 °C)] 98.4 °F (36.9 °C)  Pulse:  [] 72  Resp:  [16-19] 16  BP: (122-158)/(67-98) 122/73  SpO2:  [93 %-97 %] 96 %    Physical Exam:  Constitutional: comfortable  Abdomen: soft, nontender  Uterus: fundus firm and nontender  Extremities: No calf tenderness      Results:   CBC:    Lab Results   Component Value Date    WBC 20.0 (H) 10/10/2024    WBC 10.9 10/08/2024    WBC 10.7 2024     Lab Results   Component Value Date    HGB 10.3 (L) 10/10/2024    HGB 13.0 10/08/2024    HGB 12.7 2024      Lab Results   Component Value Date    .0 10/10/2024    .0 10/08/2024    .0 2024        Assessment/Plan   PPD #  2 --- stable  Severe pre eclampsia, s/p magnesium x 24 hrs post delivery  On Nifedipine XL 30 mg     Home  RTC 6 week postpartum  RTX 3-4 days for BP check  Rx Motrin 600 mg   Rx Nifedipine XL 30 mg  Prenatal vitamins  Pt will take BP bid at home          Delfina Longoria MD  10/11/2024  10:57 AM

## 2024-10-14 ENCOUNTER — PATIENT OUTREACH (OUTPATIENT)
Dept: CASE MANAGEMENT | Age: 39
End: 2024-10-14

## 2024-10-14 ENCOUNTER — TELEPHONE (OUTPATIENT)
Dept: OBGYN CLINIC | Facility: CLINIC | Age: 39
End: 2024-10-14

## 2024-10-14 DIAGNOSIS — E66.09 OTHER OBESITY DUE TO EXCESS CALORIES AFFECTING PREGNANCY, ANTEPARTUM (HCC): ICD-10-CM

## 2024-10-14 DIAGNOSIS — O09.511 PRIMIGRAVIDA OF ADVANCED MATERNAL AGE IN FIRST TRIMESTER (HCC): ICD-10-CM

## 2024-10-14 DIAGNOSIS — O99.210 OTHER OBESITY DUE TO EXCESS CALORIES AFFECTING PREGNANCY, ANTEPARTUM (HCC): ICD-10-CM

## 2024-10-14 NOTE — TELEPHONE ENCOUNTER
Patient need blood pressure check today if possible this after noon mom delivered on 10-9-24 and the baby is still in hospital

## 2024-10-15 ENCOUNTER — NURSE ONLY (OUTPATIENT)
Dept: OBGYN CLINIC | Facility: CLINIC | Age: 39
End: 2024-10-15

## 2024-10-15 VITALS — DIASTOLIC BLOOD PRESSURE: 86 MMHG | SYSTOLIC BLOOD PRESSURE: 121 MMHG

## 2024-10-15 DIAGNOSIS — E66.09 OTHER OBESITY DUE TO EXCESS CALORIES AFFECTING PREGNANCY, ANTEPARTUM (HCC): ICD-10-CM

## 2024-10-15 DIAGNOSIS — O99.210 OTHER OBESITY DUE TO EXCESS CALORIES AFFECTING PREGNANCY, ANTEPARTUM (HCC): ICD-10-CM

## 2024-10-15 DIAGNOSIS — O09.511 PRIMIGRAVIDA OF ADVANCED MATERNAL AGE IN FIRST TRIMESTER (HCC): ICD-10-CM

## 2024-10-15 DIAGNOSIS — Z01.30 BP CHECK: Primary | ICD-10-CM

## 2024-10-15 PROCEDURE — 99211 OFF/OP EST MAY X REQ PHY/QHP: CPT | Performed by: OBSTETRICS & GYNECOLOGY

## 2024-10-15 NOTE — PROGRESS NOTES
Patient in today for bp check. Patient delivered 10/09/24 with Dr. Perkins @ 34w6d, induced due to PIH and preeclampsia. Patient was given Mag and Nifedipine 30XL 30 mg during hospital stay. Patient discharged with Nifedipine 30mg daily. Patient has not been monitoring bp at home. Patient is asymptomatic. In office readings today were 120/85 and 121/86.     Per Dr. Perkins, patient is to monitor bp and home and send a weekly log. Patient also instructed to make another bp appointment in a week and bring bp cuff in to compare readings. Patient verbalized understanding.

## 2024-10-17 ENCOUNTER — LACTATION ENCOUNTER (OUTPATIENT)
Dept: OTHER | Facility: HOSPITAL | Age: 39
End: 2024-10-17

## 2024-10-17 NOTE — LACTATION NOTE
This note was copied from a baby's chart.     10/17/24 7624   Evaluation Type   Evaluation Type NICU/SCN   Problems & Assessment   Problems Diagnosed or Identified Premature;Sleepy   Muscle tone Appropriate for GA   Feeding Assessment   Summary Current Feeding Breast milk fortification   Breastfeeding Assessment Assisted with breastfeeding w/mother's permission;Sleepy infant, quickly pacifies   Breastfeeding lasted # of minutes 10   Breastfeeding Positions football;left breast   Latch 1   Audible Sucks/Swallows 0   Type of Nipple 2   Comfort (Breast/Nipple) 2   Hold (Positioning) 1   LATCH Score 6

## 2024-10-21 ENCOUNTER — TELEPHONE (OUTPATIENT)
Dept: OBGYN CLINIC | Facility: CLINIC | Age: 39
End: 2024-10-21

## 2024-10-21 DIAGNOSIS — O99.210 OTHER OBESITY DUE TO EXCESS CALORIES AFFECTING PREGNANCY, ANTEPARTUM (HCC): ICD-10-CM

## 2024-10-21 DIAGNOSIS — O09.511 PRIMIGRAVIDA OF ADVANCED MATERNAL AGE IN FIRST TRIMESTER (HCC): ICD-10-CM

## 2024-10-21 DIAGNOSIS — E66.09 OTHER OBESITY DUE TO EXCESS CALORIES AFFECTING PREGNANCY, ANTEPARTUM (HCC): ICD-10-CM

## 2024-10-21 NOTE — TELEPHONE ENCOUNTER
Bibiana called from the  leave management team, need the date and type of delivery patient delivered for her baby.  Request a nurse to call to confirm.  Bibiana asked I send this over as a michelle priority, need this information to pay patient for sick leave

## 2024-10-22 ENCOUNTER — NURSE ONLY (OUTPATIENT)
Dept: OBGYN CLINIC | Facility: CLINIC | Age: 39
End: 2024-10-22

## 2024-10-22 ENCOUNTER — TELEPHONE (OUTPATIENT)
Dept: OBGYN CLINIC | Facility: CLINIC | Age: 39
End: 2024-10-22

## 2024-10-22 VITALS — SYSTOLIC BLOOD PRESSURE: 120 MMHG | DIASTOLIC BLOOD PRESSURE: 68 MMHG

## 2024-10-22 DIAGNOSIS — E66.09 OTHER OBESITY DUE TO EXCESS CALORIES AFFECTING PREGNANCY, ANTEPARTUM (HCC): ICD-10-CM

## 2024-10-22 DIAGNOSIS — O99.210 OTHER OBESITY DUE TO EXCESS CALORIES AFFECTING PREGNANCY, ANTEPARTUM (HCC): ICD-10-CM

## 2024-10-22 DIAGNOSIS — O09.511 PRIMIGRAVIDA OF ADVANCED MATERNAL AGE IN FIRST TRIMESTER (HCC): ICD-10-CM

## 2024-10-22 DIAGNOSIS — Z01.30 BP CHECK: Primary | ICD-10-CM

## 2024-10-22 PROCEDURE — 99211 OFF/OP EST MAY X REQ PHY/QHP: CPT | Performed by: OBSTETRICS & GYNECOLOGY

## 2024-10-22 NOTE — TELEPHONE ENCOUNTER
Breast pump order received via fax. Order completed and faxed to [facility]. Original to scanning.

## 2024-10-22 NOTE — PROGRESS NOTES
Patient here for postpartum BP check. Had vaginal delivery on 10/9/24 with Dr. Perkins at 34wks and 6days. Patient with Preeclampsia and given Magnesium Sulfate in hospital and 24 hours postpartum. Patient started with Nifedipine XL 30mg daily while in hospital and told to continue at home once discharged. Patient has been taking daily and checking BPs. Patient's log reading are as follows.   10/16 - 143/89  10/17 - 157/94 and 135/89  10/19 - 134/90  10/20 - 137/92  10/21 - 137/86  Patient denies any HA, blurred vision, swelling, or epigastric pain today. Reports HA on 10/16 and 10/17 but improved with motrin. BP in office today manually is 120/68, with patient's BP machine 124/88, and with office BP machine 122/81. Spoke to Dr. Perkins, on call MD and informed of all. Per Dr. Perkins, since BP range normal today patient to continue Nifedipine XL 30mg daily and check BP x 3 days. Patient to send readings in at 3 days. Patient informed and verbalized understanding. Patient aware to contact office if worsening symptoms or concerns.

## 2024-10-26 ENCOUNTER — PATIENT MESSAGE (OUTPATIENT)
Dept: OBGYN CLINIC | Facility: CLINIC | Age: 39
End: 2024-10-26

## 2024-10-26 DIAGNOSIS — O09.511 PRIMIGRAVIDA OF ADVANCED MATERNAL AGE IN FIRST TRIMESTER (HCC): ICD-10-CM

## 2024-10-26 DIAGNOSIS — O99.210 OTHER OBESITY DUE TO EXCESS CALORIES AFFECTING PREGNANCY, ANTEPARTUM (HCC): ICD-10-CM

## 2024-10-26 DIAGNOSIS — E66.09 OTHER OBESITY DUE TO EXCESS CALORIES AFFECTING PREGNANCY, ANTEPARTUM (HCC): ICD-10-CM

## 2024-10-29 ENCOUNTER — TELEPHONE (OUTPATIENT)
Dept: OBGYN CLINIC | Facility: CLINIC | Age: 39
End: 2024-10-29

## 2024-10-29 DIAGNOSIS — O99.210 OTHER OBESITY DUE TO EXCESS CALORIES AFFECTING PREGNANCY, ANTEPARTUM (HCC): ICD-10-CM

## 2024-10-29 DIAGNOSIS — O09.511 PRIMIGRAVIDA OF ADVANCED MATERNAL AGE IN FIRST TRIMESTER (HCC): ICD-10-CM

## 2024-10-29 DIAGNOSIS — E66.09 OTHER OBESITY DUE TO EXCESS CALORIES AFFECTING PREGNANCY, ANTEPARTUM (HCC): ICD-10-CM

## 2024-10-29 NOTE — TELEPHONE ENCOUNTER
Short term disability forms received via [email or web address] message on 10/28/24. [email or web address] message sent for authorization. Logged for processing.

## 2024-10-30 ENCOUNTER — TELEPHONE (OUTPATIENT)
Dept: OBGYN CLINIC | Facility: CLINIC | Age: 39
End: 2024-10-30

## 2024-10-30 NOTE — TELEPHONE ENCOUNTER
BP log  Received: 5 days ago  Gretchen Kan, DAVID  P [facility] Ob/Gyne Clinical Staff  Patient in office for BP check on 10/22. Per Dr. Perkins, patient to check BP x 3 days and send log in. Patient currently taking Nifedipine XL 30mg daily. Per Dr. Perkins, depending on readings may adjust BP medication.

## 2024-11-02 ENCOUNTER — TELEPHONE (OUTPATIENT)
Dept: OBGYN UNIT | Facility: HOSPITAL | Age: 39
End: 2024-11-02

## 2024-11-04 ENCOUNTER — TELEPHONE (OUTPATIENT)
Dept: OTOLARYNGOLOGY | Facility: CLINIC | Age: 39
End: 2024-11-04

## 2024-11-04 RX ORDER — FAMOTIDINE 20 MG/1
20 TABLET, FILM COATED ORAL 2 TIMES DAILY
Qty: 60 TABLET | Refills: 1 | Status: SHIPPED | OUTPATIENT
Start: 2024-11-04

## 2024-11-04 NOTE — TELEPHONE ENCOUNTER
Current Outpatient Medications:     FAMOTIDINE 20 MG Oral Tab, TAKE 1 TABLET BY MOUTH TWICE A DAY, Disp: 60 tablet, Rfl: 1

## 2024-11-08 NOTE — TELEPHONE ENCOUNTER
Dr. Perkins      Please sign off on form if you agree to: Disability  (place your signature on the first page only)    -From your Inbasket, Highlight the patient and click Chart   -Double click the 10/29/2024 Forms Completion telephone encounter  -Scroll down to the Media section   -Click the blue Hyperlink: Disab Dr Perkins 11/08/24  -Click Acknowledge located in the top right ribbon/menu   -Drag the mouse into the blank space of the document and a + sign will appear. Left click to   electronically sign the document.     Thank you,     Zenia

## 2024-11-13 NOTE — TELEPHONE ENCOUNTER
Forms E faxed     Efax Date: 11/13/24  Time fax sent:1:38 PM  Confirmation time : 3:36 PM  JOB ID:[id number]    Forms completed and signed faxed to [facility] [phone]

## 2024-11-19 ENCOUNTER — POSTPARTUM (OUTPATIENT)
Dept: OBGYN CLINIC | Facility: CLINIC | Age: 39
End: 2024-11-19

## 2024-11-19 VITALS
DIASTOLIC BLOOD PRESSURE: 85 MMHG | SYSTOLIC BLOOD PRESSURE: 132 MMHG | HEART RATE: 98 BPM | BODY MASS INDEX: 35 KG/M2 | WEIGHT: 246.19 LBS

## 2024-11-19 RX ORDER — ACETAMINOPHEN AND CODEINE PHOSPHATE 120; 12 MG/5ML; MG/5ML
0.35 SOLUTION ORAL DAILY
Qty: 84 TABLET | Refills: 1 | Status: SHIPPED | OUTPATIENT
Start: 2024-11-19 | End: 2025-11-19

## 2024-11-24 PROBLEM — D25.9 UTERINE FIBROID DURING PREGNANCY, ANTEPARTUM (HCC): Status: RESOLVED | Noted: 2024-04-29 | Resolved: 2024-11-24

## 2024-11-24 PROBLEM — O99.210 OBESITY AFFECTING PREGNANCY, ANTEPARTUM (HCC): Status: RESOLVED | Noted: 2024-05-05 | Resolved: 2024-11-24

## 2024-11-24 PROBLEM — F41.9 ANXIETY DISORDER AFFECTING PREGNANCY, ANTEPARTUM (HCC): Status: RESOLVED | Noted: 2024-05-05 | Resolved: 2024-11-24

## 2024-11-24 PROBLEM — O13.3 GESTATIONAL HYPERTENSION, THIRD TRIMESTER (HCC): Status: RESOLVED | Noted: 2024-10-08 | Resolved: 2024-11-24

## 2024-11-24 PROBLEM — Z91.89 AT RISK FOR POSTPARTUM DEPRESSION: Status: RESOLVED | Noted: 2024-05-17 | Resolved: 2024-11-24

## 2024-11-24 PROBLEM — O99.340 ANXIETY DISORDER AFFECTING PREGNANCY, ANTEPARTUM (HCC): Status: RESOLVED | Noted: 2024-05-05 | Resolved: 2024-11-24

## 2024-11-24 PROBLEM — O34.10 UTERINE FIBROID DURING PREGNANCY, ANTEPARTUM (HCC): Status: RESOLVED | Noted: 2024-04-29 | Resolved: 2024-11-24

## 2024-11-24 PROBLEM — O14.13 SEVERE PRE-ECLAMPSIA IN THIRD TRIMESTER (HCC): Status: RESOLVED | Noted: 2024-10-09 | Resolved: 2024-11-24

## 2024-11-24 PROBLEM — O09.511 PRIMIGRAVIDA OF ADVANCED MATERNAL AGE IN FIRST TRIMESTER (HCC): Status: RESOLVED | Noted: 2024-05-05 | Resolved: 2024-11-24

## 2024-11-25 NOTE — PROGRESS NOTES
Horace Otero is a 39 year old female  here for 6 week post-partum visit.  Patient delivered a  female infant on 10/9/2024 via spontaneous vaginal delivery.  Patient desires something for contraception-- currently on Procardia for BP.  Patient is breast feeding.   Patient denies symptoms of depression, [place]  depression scale score of 1 out of 30.     [place]  DEPRESSION SCALE  I have been able to laugh and see the funny side of things: As much as I always could  I have looked forward with enjoyment to things: As much as I ever did  I have been anxious or worried for no good reason: No, not at all  I have felt scared or panicky for no good reason: No, not at all  Things have been getting on top of me: No, most of the time I have coped quite well  I have been so unhappy that I have had difficulty sleeping: Not at all  I have felt sad or miserable: No, not at all  I have been so unhappy that I have been crying: No, never  The thought of harming myself has occurred to me: Never  Total: 1    OBSTETRIC HISTORY  OB History    Para Term  AB Living   1 1 0 1 0 1   SAB IAB Ectopic Multiple Live Births   0 0 0 0 1      # Outcome Date GA Lbr Danie/2nd Weight Sex Type Anes PTL Lv   1  10/09/24 34w6d 11:10  00:16 5 lb 7.8 oz (2.49 kg) F NORMAL SPONT Local Y TERRANCE       GYNE HISTORY        MEDICATIONS:    Current Outpatient Medications:     escitalopram 20 MG Oral Tab, Take 1 tablet (20 mg total) by mouth daily. W 5 mg for total 25 mg daily., Disp: 30 tablet, Rfl: 5    escitalopram 5 MG Oral Tab, Take 1 tablet (5 mg total) by mouth daily. Along w 20 mg for total 25 mg daily., Disp: 30 tablet, Rfl: 5    Norethindrone (FERNANDA) 0.35 MG Oral Tab, Take 1 tablet (0.35 mg total) by mouth daily., Disp: 84 tablet, Rfl: 1    famotidine 20 MG Oral Tab, Take 1 tablet (20 mg total) by mouth 2 (two) times daily., Disp: 60 tablet, Rfl: 1    ibuprofen 600 MG Oral Tab, Take 1 tablet (600 mg  total) by mouth every 6 (six) hours., Disp: 30 tablet, Rfl: 0    NIFEdipine ER 30 MG Oral Tablet 24 Hr, Take 1 tablet (30 mg total) by mouth daily., Disp: 30 tablet, Rfl: 2    lisdexamfetamine (VYVANSE) 50 MG Oral Cap, Take 1 capsule (50 mg total) by mouth daily., Disp: 30 capsule, Rfl: 0    [START ON 12/8/2024] lisdexamfetamine (VYVANSE) 50 MG Oral Cap, Take 1 capsule (50 mg total) by mouth daily., Disp: 30 capsule, Rfl: 0    aspirin 81 MG Oral Tab EC, Take 1 tablet (81 mg total) by mouth daily., Disp: , Rfl:     loratadine-pseudoephedrine ER (CLARITIN-D 24 HOUR)  MG Oral Tablet 24 Hr, Take 1 tablet by mouth daily., Disp: , Rfl:     prenatal vitamin with DHA 27-0.8-228 MG Oral Cap, Take 1 capsule by mouth daily., Disp: , Rfl:     albuterol 108 (90 Base) MCG/ACT Inhalation Aero Soln, Inhale 1 puff into the lungs every 6 (six) hours as needed for Shortness of Breath., Disp: 1 each, Rfl: 1    ALLERGIES:  Allergies[1]    PHYSICAL EXAM  /85   Pulse 98   Wt 246 lb 3.2 oz (111.7 kg)   LMP 03/15/2024   Breastfeeding No   BMI 35.33 kg/m²   General:    well nourished, well developed woman in no acute distress  Abdomen:    soft, nontender, no masses  External Genitalia:  normal appearance, hair distribution, and no lesions  Vagina:    normal appearance without lesions, no abnormal discharge  Cervix:    normal without tenderness on motion  Uterus:    normal in size, contour, position, mobility, without tenderness  Adnexa:   normal without masses or tenderness  Perineum:   well healed perineum  Anus:    no hemorroids       ASSESSMENT/PLAN    Horace was seen today for postpartum care.    Diagnoses and all orders for this visit:    Encounter for postpartum visit (HCC)    Other orders  -     Norethindrone (FERNANDA) 0.35 MG Oral Tab; Take 1 tablet (0.35 mg total) by mouth daily.      Follow up w/ PCP to manage BP  Discussed birthcontrol: Patient has chosen  minipill .  Patient to return for annual gyne exam in  May.       [1] No Known Allergies

## 2025-01-03 RX ORDER — NIFEDIPINE 30 MG
30 TABLET, EXTENDED RELEASE ORAL DAILY
Qty: 30 TABLET | Refills: 2 | OUTPATIENT
Start: 2025-01-03

## 2025-01-03 NOTE — TELEPHONE ENCOUNTER
Requested Prescriptions     Pending Prescriptions Disp Refills    NIFEDIPINE ER 30 MG Oral Tablet 24 Hr [Pharmacy Med Name: NIFEDIPINE ER 30 MG TABLET] 30 tablet 2     Sig: TAKE 1 TABLET BY MOUTH EVERY DAY     Postpartum 11/19/24. Eve sent to patient to clarify if she has follow-up with PCP for BP meds.

## 2025-01-09 ENCOUNTER — OFFICE VISIT (OUTPATIENT)
Dept: INTERNAL MEDICINE CLINIC | Facility: CLINIC | Age: 40
End: 2025-01-09

## 2025-01-09 VITALS
DIASTOLIC BLOOD PRESSURE: 61 MMHG | SYSTOLIC BLOOD PRESSURE: 126 MMHG | HEIGHT: 70 IN | BODY MASS INDEX: 35.39 KG/M2 | TEMPERATURE: 99 F | HEART RATE: 90 BPM | OXYGEN SATURATION: 100 % | WEIGHT: 247.19 LBS

## 2025-01-09 DIAGNOSIS — F90.0 ATTENTION DEFICIT HYPERACTIVITY DISORDER (ADHD), PREDOMINANTLY INATTENTIVE TYPE: ICD-10-CM

## 2025-01-09 DIAGNOSIS — I15.8 OTHER SECONDARY HYPERTENSION: ICD-10-CM

## 2025-01-09 DIAGNOSIS — O14.93 PRE-ECLAMPSIA IN THIRD TRIMESTER (HCC): Primary | ICD-10-CM

## 2025-01-09 PROCEDURE — 99214 OFFICE O/P EST MOD 30 MIN: CPT | Performed by: INTERNAL MEDICINE

## 2025-01-09 NOTE — PROGRESS NOTES
Horace Otero is a 39 year old female.  Chief Complaint   Patient presents with    Hypertension     Patient gave birth on 10-9-2024. On 10-8-24 patient sated she had new onset of hypertension.  Patient feels \" fine\"       HPI:       Here to discuss her BP.  Admitted to [facility] 10/8/24 due to elevated BP.  By 1am the next day, decision was made to induce her.  Had NVD.  (Baby girl, Aliyah - doing well)    She is 3 mos post-partum (delivery on 10/9/24 at 34W 6D due to preeclampsia)  BP's post-partum in the hospital were 120's-150's/70's-90's.  Started on nifedipine  BP at Dr. Perkins's office on 11/19/24 was 132/85.  Was advised to follow up here.    At home, BP was 129/81 last pm.  Today 159/104    Was seeing psych [facility] for Vyvanse while she was pregnant ; has f/u appt in Feb.  May not need to keep seeing her if she is given the okay      Current Outpatient Medications   Medication Sig Dispense Refill    lisdexamfetamine (VYVANSE) 50 MG Oral Cap Take 1 capsule (50 mg total) by mouth every morning. 30 capsule 0    escitalopram 20 MG Oral Tab Take 1 tablet (20 mg total) by mouth daily. W 5 mg for total 25 mg daily. 30 tablet 5    escitalopram 5 MG Oral Tab Take 1 tablet (5 mg total) by mouth daily. Along w 20 mg for total 25 mg daily. 30 tablet 5    famotidine 20 MG Oral Tab Take 1 tablet (20 mg total) by mouth 2 (two) times daily. 60 tablet 1    NIFEdipine ER 30 MG Oral Tablet 24 Hr Take 1 tablet (30 mg total) by mouth daily. 30 tablet 2    prenatal vitamin with DHA 27-0.8-228 MG Oral Cap Take 1 capsule by mouth daily.      albuterol 108 (90 Base) MCG/ACT Inhalation Aero Soln Inhale 1 puff into the lungs every 6 (six) hours as needed for Shortness of Breath. 1 each 1    [START ON 2/5/2025] lisdexamfetamine (VYVANSE) 50 MG Oral Cap Take 1 capsule (50 mg total) by mouth every morning. 30 capsule 0    Norethindrone (FERNANDA) 0.35 MG Oral Tab Take 1 tablet (0.35 mg total) by mouth daily. (Patient not taking: Reported on 1/9/2025)  84 tablet 1    ibuprofen 600 MG Oral Tab Take 1 tablet (600 mg total) by mouth every 6 (six) hours. (Patient not taking: Reported on 1/9/2025) 30 tablet 0    aspirin 81 MG Oral Tab EC Take 1 tablet (81 mg total) by mouth daily. (Patient not taking: Reported on 1/9/2025)      loratadine-pseudoephedrine ER (CLARITIN-D 24 HOUR)  MG Oral Tablet 24 Hr Take 1 tablet by mouth daily. (Patient not taking: Reported on 1/9/2025)        Past Medical History:    ADHD    Alopecia    Anxiety    Depression    Migraines      Social History:  Social History     Socioeconomic History    Marital status:    Tobacco Use    Smoking status: Never     Passive exposure: Never    Smokeless tobacco: Never   Vaping Use    Vaping status: Never Used   Substance and Sexual Activity    Alcohol use: Not Currently     Comment: 2-3 drinks weekly    Drug use: No    Sexual activity: Yes     Birth control/protection: OCP   Other Topics Concern    Caffeine Concern Yes     Comment: Coffee 1 cup daily     Social Drivers of Health     Financial Resource Strain: Low Risk  (4/29/2024)    Financial Resource Strain     Difficulty of Paying Living Expenses: Not hard at all     Med Affordability: No   Food Insecurity: No Food Insecurity (4/29/2024)    Food Insecurity     Food Insecurity: Never true   Transportation Needs: No Transportation Needs (4/29/2024)    Transportation Needs     Lack of Transportation: No   Stress: No Stress Concern Present (4/29/2024)    Stress     Feeling of Stress : No   Housing Stability: Low Risk  (4/29/2024)    Housing Stability     Housing Instability: No        REVIEW OF SYSTEMS:   GENERAL HEALTH: feels well otherwise  RESPIRATORY: no SOB  CARDIOVASCULAR: no chest pain/pressure  GI: no nausea, vomiting, diarrhea    Wt Readings from Last 5 Encounters:   01/09/25 247 lb 3.2 oz (112.1 kg)   11/19/24 246 lb 3.2 oz (111.7 kg)   10/08/24 270 lb (122.5 kg)   09/25/24 264 lb (119.7 kg)   09/23/24 264 lb 3.2 oz (119.8 kg)      Body mass index is 35.47 kg/m².      EXAM:   /61 (BP Location: Right arm, Patient Position: Sitting, Cuff Size: large)   Pulse 90   Temp 98.6 °F (37 °C) (Oral)   Ht 5' 10\" (1.778 m)   Wt 247 lb 3.2 oz (112.1 kg)   LMP 03/15/2024 (Approximate)   SpO2 100%   Breastfeeding Yes   BMI 35.47 kg/m²   /72 (office cuff)  /87 (pt's machine)  GENERAL: well developed, well nourished, in no apparent distress    LUNGS: clear to auscultation  CARDIO: RRR, normal S1S2, without murmur or gallop  EXTREMITIES: no LE edema    ASSESSMENT AND PLAN:     Preeclampsia  Post-partum hypertension  -currently on low dose nifedipine 30mg/day  -BP today very good; pt's BP machine overestimated her SBP by >20 mmHg.  Cuff was fairly tight fitting and machine is apparently old.  Rec getting new machine w/large cuff  -trial off nifedipine  -check BP's at home BID -- RTC (w/machine) in 2-3 weeks    ADHD  -was referred to psych (seeing [facility]N) during pregnancy to manage the vyvanse  -has f/u next month  -if released, she will resume Rx'es with this office    The patient indicates understanding of these issues and agrees to the plan.    Jamliah Moran MD, 01/09/25, 1:30 PM

## 2025-01-21 ENCOUNTER — OFFICE VISIT (OUTPATIENT)
Dept: INTERNAL MEDICINE CLINIC | Facility: CLINIC | Age: 40
End: 2025-01-21

## 2025-01-21 VITALS
HEART RATE: 92 BPM | OXYGEN SATURATION: 95 % | WEIGHT: 247 LBS | TEMPERATURE: 98 F | DIASTOLIC BLOOD PRESSURE: 82 MMHG | SYSTOLIC BLOOD PRESSURE: 124 MMHG | BODY MASS INDEX: 35.36 KG/M2 | HEIGHT: 70 IN

## 2025-01-21 DIAGNOSIS — Z87.59 HISTORY OF POSTPARTUM HYPERTENSION: Primary | ICD-10-CM

## 2025-01-21 DIAGNOSIS — Z86.79 HISTORY OF POSTPARTUM HYPERTENSION: Primary | ICD-10-CM

## 2025-01-21 PROCEDURE — 99213 OFFICE O/P EST LOW 20 MIN: CPT | Performed by: INTERNAL MEDICINE

## 2025-01-21 NOTE — PROGRESS NOTES
Horace Otero is a 39 year old female.  Chief Complaint   Patient presents with    Checkup     Blood pressure follow up      HPI:       Follow up of post-partum HTN.  Nifedipine stopped at last appt 2 weeks ago.     Got a new BP machine recently -- today /104        Was seeing psych APN for Vyvanse while she was pregnant ; has f/u appt in Feb.  May not need to keep seeing her if she is given the okay      Current Outpatient Medications   Medication Sig Dispense Refill    lisdexamfetamine (VYVANSE) 50 MG Oral Cap Take 1 capsule (50 mg total) by mouth every morning. 30 capsule 0    [START ON 2/5/2025] lisdexamfetamine (VYVANSE) 50 MG Oral Cap Take 1 capsule (50 mg total) by mouth every morning. 30 capsule 0    escitalopram 20 MG Oral Tab Take 1 tablet (20 mg total) by mouth daily. W 5 mg for total 25 mg daily. 30 tablet 5    escitalopram 5 MG Oral Tab Take 1 tablet (5 mg total) by mouth daily. Along w 20 mg for total 25 mg daily. 30 tablet 5    famotidine 20 MG Oral Tab Take 1 tablet (20 mg total) by mouth 2 (two) times daily. 60 tablet 1    ibuprofen 600 MG Oral Tab Take 1 tablet (600 mg total) by mouth every 6 (six) hours. 30 tablet 0    loratadine-pseudoephedrine ER (CLARITIN-D 24 HOUR)  MG Oral Tablet 24 Hr Take 1 tablet by mouth daily. As needed      prenatal vitamin with DHA 27-0.8-228 MG Oral Cap Take 1 capsule by mouth daily.      albuterol 108 (90 Base) MCG/ACT Inhalation Aero Soln Inhale 1 puff into the lungs every 6 (six) hours as needed for Shortness of Breath. 1 each 1    Norethindrone (FERNANDA) 0.35 MG Oral Tab Take 1 tablet (0.35 mg total) by mouth daily. (Patient not taking: Reported on 1/21/2025) 84 tablet 1    NIFEdipine ER 30 MG Oral Tablet 24 Hr Take 1 tablet (30 mg total) by mouth daily. (Patient not taking: Reported on 1/21/2025) 30 tablet 2    aspirin 81 MG Oral Tab EC Take 1 tablet (81 mg total) by mouth daily. (Patient not taking: Reported on 1/21/2025)        Past Medical History:     ADHD    Alopecia    Anxiety    Depression    Migraines      Social History:  Social History     Socioeconomic History    Marital status:    Tobacco Use    Smoking status: Never     Passive exposure: Never    Smokeless tobacco: Never   Vaping Use    Vaping status: Never Used   Substance and Sexual Activity    Alcohol use: Not Currently     Comment: 2-3 drinks weekly    Drug use: No    Sexual activity: Yes     Birth control/protection: OCP   Other Topics Concern    Caffeine Concern Yes     Comment: Coffee 1 cup daily     Social Drivers of Health     Financial Resource Strain: Low Risk  (4/29/2024)    Financial Resource Strain     Difficulty of Paying Living Expenses: Not hard at all     Med Affordability: No   Food Insecurity: No Food Insecurity (4/29/2024)    Food Insecurity     Food Insecurity: Never true   Transportation Needs: No Transportation Needs (4/29/2024)    Transportation Needs     Lack of Transportation: No   Stress: No Stress Concern Present (4/29/2024)    Stress     Feeling of Stress : No   Housing Stability: Low Risk  (4/29/2024)    Housing Stability     Housing Instability: No        REVIEW OF SYSTEMS:   GENERAL HEALTH: feels well otherwise  RESPIRATORY: no SOB  CARDIOVASCULAR: no chest pain/pressure  GI: no nausea, vomiting, diarrhea    Wt Readings from Last 5 Encounters:   01/21/25 247 lb (112 kg)   01/09/25 247 lb 3.2 oz (112.1 kg)   11/19/24 246 lb 3.2 oz (111.7 kg)   10/08/24 270 lb (122.5 kg)   09/25/24 264 lb (119.7 kg)     Body mass index is 35.44 kg/m².      EXAM:   /82   Pulse 92   Temp 97.8 °F (36.6 °C)   Ht 5' 10\" (1.778 m)   Wt 247 lb (112 kg)   LMP 03/15/2024 (Approximate)   SpO2 95%   BMI 35.44 kg/m²   Pt's machine 138/92 R, 136/91 L    Repeat: Pt's machine 129/89 R (large cuff)  Our manual BP cuff (large cuff): 122/72 L, 116/70 R    GENERAL: well developed, well nourished, in no apparent distress  LUNGS: clear to auscultation  CARDIO: RRR, normal S1S2, without  murmur or gallop  EXTREMITIES: no LE edema    ASSESSMENT AND PLAN:     Preeclampsia  Post-partum hypertension  -nifedipine stopped at last visit 1/9/24  -Pt got a new BP machine -- it is again reading significantly higher than the office cuff  -BP completely normal in the office.  Rec staying off medication    ADHD  -was referred to psych (seeing [facility]N) during pregnancy to manage the vyvanse  -has f/u next month  -if released, she will resume Rx'es with this office    The patient indicates understanding of these issues and agrees to the plan.

## 2025-02-13 ENCOUNTER — TELEPHONE (OUTPATIENT)
Dept: OTOLARYNGOLOGY | Facility: CLINIC | Age: 40
End: 2025-02-13

## 2025-02-13 RX ORDER — FAMOTIDINE 20 MG/1
20 TABLET, FILM COATED ORAL 2 TIMES DAILY
Qty: 60 TABLET | Refills: 2 | Status: SHIPPED | OUTPATIENT
Start: 2025-02-13

## 2025-02-13 NOTE — TELEPHONE ENCOUNTER
90 day prescription request for   Famotidine 20 mg tablet  Quant-180.0  Take 1 tablet by mouth twice a day

## 2025-03-12 NOTE — TELEPHONE ENCOUNTER
Rx given to pt in the office today [EENT/Resp Symptoms] : EENT/RESPIRATORY SYMPTOMS [Nasal congestion] : nasal congestion [___ Day(s)] : [unfilled] day(s) [Active] : active [Decreased appetite] : decreased appetite [Sick Contacts: ___] : sick contacts: [unfilled] [Clear rhinorrhea] : clear rhinorrhea [Wet cough] : wet cough [Sore Throat] : sore throat [Cough] : cough [Decreased Appetite] : decreased appetite [Known Exposure to COVID-19] : no known exposure to COVID-19 [Hx of recent COVID-19 infection] : no history of recent COVID-19 infection [Fever] : no fever [Headache] : no headache [Change in sleep] : no change in sleep  [Eye Redness] : no eye redness [Eye Discharge] : no eye discharge [Eye Itching] : no eye itching [Ear Pain] : no ear pain [Wheezing] : no wheezing [Shortness of Breath] : no shortness of breath [Vomiting] : no vomiting [Diarrhea] : no diarrhea [Decreased Urine Output] : no decreased urine output [Rash] : no rash [Loss of taste] : no loss of taste [Loss of smell] : no loss of smell [FreeTextEntry9] : stomach pain

## 2025-05-13 RX ORDER — NORETHINDRONE 0.35 MG/1
0.35 TABLET ORAL DAILY
Qty: 28 TABLET | Refills: 5 | OUTPATIENT
Start: 2025-05-13

## 2025-05-14 RX ORDER — FAMOTIDINE 20 MG/1
20 TABLET, FILM COATED ORAL 2 TIMES DAILY
Qty: 60 TABLET | Refills: 2 | Status: SHIPPED | OUTPATIENT
Start: 2025-05-14

## 2025-05-16 ENCOUNTER — OFFICE VISIT (OUTPATIENT)
Dept: OBGYN CLINIC | Facility: CLINIC | Age: 40
End: 2025-05-16

## 2025-05-16 VITALS
BODY MASS INDEX: 36 KG/M2 | WEIGHT: 247.63 LBS | DIASTOLIC BLOOD PRESSURE: 86 MMHG | SYSTOLIC BLOOD PRESSURE: 123 MMHG | HEART RATE: 80 BPM

## 2025-05-16 DIAGNOSIS — N91.2 NO PERIODS: ICD-10-CM

## 2025-05-16 DIAGNOSIS — Z01.411 ENCOUNTER FOR GYNECOLOGICAL EXAMINATION WITH ABNORMAL FINDING: Primary | ICD-10-CM

## 2025-05-16 DIAGNOSIS — Z30.09 BIRTH CONTROL COUNSELING: ICD-10-CM

## 2025-05-16 PROCEDURE — 99395 PREV VISIT EST AGE 18-39: CPT | Performed by: OBSTETRICS & GYNECOLOGY

## 2025-05-16 RX ORDER — ACETAMINOPHEN AND CODEINE PHOSPHATE 120; 12 MG/5ML; MG/5ML
0.35 SOLUTION ORAL DAILY
Qty: 84 TABLET | Refills: 3 | Status: SHIPPED | OUTPATIENT
Start: 2025-05-16 | End: 2026-05-16

## 2025-05-16 NOTE — PROGRESS NOTES
The following individual(s) verbally consented to be recorded using ambient AI listening technology and understand that they can each withdraw their consent to this listening technology at any point by asking the clinician to turn off or pause the recording:    Patient name: Horace AVILEZ Shanna  Additional names:

## 2025-05-21 NOTE — PROGRESS NOTES
Horace Otero is a 39 year old female  Patient's last menstrual period was 03/15/2024 (approximate).   Chief Complaint   Patient presents with    Gyn Exam     ANNUAL EXAM  -Reviewed Preventative/Wellness form with patient.     .  History of Present Illness  Ms. Horace Otero is a 39 year old female who presents for her annual gynecologic exam.    She last visited in 2024 for her six-week postpartum check-up after delivering her daughter in 2024. At that time, she was prescribed the mini pill for contraception but has not started it due to concerns about its impact on her milk supply. She has been using condoms as her primary method of contraception and has been sexually active. Her last menstrual period was in 2025, and she is currently breastfeeding, planning to continue until her daughter's first birthday in 2025.    She is currently taking Lexapro and has discontinued Procardia, as her blood pressure is now stable. She has a history of blood pressure issues during pregnancy. She has previously used regular birth control pills and the NuvaRing, and she was typically prescribed low estrogen pills due to irregular periods.    She has no current interest in STD testing and is not due for a Pap smear until 2026, as her last Pap in 2023 was normal with negative HPV results.    OBSTETRICS HISTORY:     OB History    Para Term  AB Living   1 1 0 1 0 1   SAB IAB Ectopic Multiple Live Births   0 0 0 0 1      # Outcome Date GA Lbr Danie/2nd Weight Sex Type Anes PTL Lv   1  10/09/24 34w6d 11:10 / 00:16 5 lb 7.8 oz (2.49 kg) F NORMAL [facility]      Name: Aliyah Calixto      Apgar1: 5  Apgar5: 8       GYNE HISTORY:     Periods absent      BCM:  Condoms    History   Sexual Activity    Sexual activity: Yes    Birth control/ protection: OCP        Pap Date: 23  Pap Result Notes: PAP NEG.HPV NEG          Latest Ref Rng & Units 2023      4:17 PM 2019     4:28 PM   RECENT PAP RESULTS   INTERPRETATION/RESULT: Negative for intraepithelial lesion or malignancy Negative for intraepithelial lesion or malignancy  Negative for intraepithelial lesion or malignancy    HPV Negative Negative  Negative          MEDICAL HISTORY:     Past Medical History:    ADHD    Alopecia    Anxiety    Depression    Migraines     Past Surgical History:   Procedure Laterality Date    Eye surgery       OB History    Para Term  AB Living   1 1 0 1 0 1   SAB IAB Ectopic Multiple Live Births   0 0 0 0 1        SOCIAL HISTORY:     Tobacco Use: Low Risk  (2025)    Patient History     Smoking Tobacco Use: Never     Smokeless Tobacco Use: Never     Passive Exposure: Never       FAMILY HISTORY:     Family History   Problem Relation Age of Onset    Depression Mother     Anxiety Mother     Hypertension Mother     Other (Other) Mother         Parkinson's/ Hepatitis from needle stick in s    Diabetes Paternal Grandmother     Psychiatric Paternal Grandfather         Bipolar disorder, schizophrenia r/t PTSD related to  service    Blood Disorder Sister     Psoriasis Sister     Asthma Sister     Diabetes Other     Breast Cancer Other          MEDICATIONS:       Current Outpatient Medications:     Norethindrone (FERNANDA) 0.35 MG Oral Tab, Take 1 tablet (0.35 mg total) by mouth daily., Disp: 84 tablet, Rfl: 3    FAMOTIDINE 20 MG Oral Tab, TAKE 1 TABLET BY MOUTH TWICE A DAY, Disp: 60 tablet, Rfl: 2    lisdexamfetamine (VYVANSE) 50 MG Oral Cap, Take 1 capsule (50 mg total) by mouth daily., Disp: 30 capsule, Rfl: 0    escitalopram 20 MG Oral Tab, Take 1 tablet (20 mg total) by mouth daily. W 5 mg for total 25 mg daily., Disp: 30 tablet, Rfl: 5    escitalopram 5 MG Oral Tab, Take 1 tablet (5 mg total) by mouth daily. Along w 20 mg for total 25 mg daily., Disp: 30 tablet, Rfl: 5    loratadine-pseudoephedrine ER (CLARITIN-D 24 HOUR)  MG Oral Tablet 24 Hr, Take  1 tablet by mouth daily. As needed, Disp: , Rfl:     prenatal vitamin with DHA 27-0.8-228 MG Oral Cap, Take 1 capsule by mouth daily., Disp: , Rfl:     albuterol 108 (90 Base) MCG/ACT Inhalation Aero Soln, Inhale 1 puff into the lungs every 6 (six) hours as needed for Shortness of Breath., Disp: 1 each, Rfl: 1    ALLERGIES:     No Known Allergies      REVIEW OF SYSTEMS:     Constitutional:    denies fever / chills  Eyes:     denies blurred or double vision  Cardiovascular:  denies chest pain or palpitations  Respiratory:    denies shortness of breath  Gastrointestinal:  denies severe abdominal pain, frequent diarrhea or constipation, nausea / vomiting  Genitourinary:    denies dysuria, bothersome incontinence  Skin/Breast:   denies any breast pain, lumps, or discharge  Neurological:    denies frequent severe headaches  Psychiatric:   denies depression or anxiety, thoughts of harming self or others  Heme/Lymph:    denies easy bruising or bleeding      PHYSICAL EXAM:   Blood pressure 123/86, pulse 80, weight 247 lb 9.6 oz (112.3 kg), last menstrual period 03/15/2024, currently breastfeeding.  Constitutional:  well developed, well nourished  Head/Face:  normocephalic  Neck/Thyroid: thyroid symmetric, no thyromegaly, no nodules, no adenopathy  Lymphatic: no abnormal supraclavicular or axillary adenopathy is noted  Breast:   normal without palpable masses, tenderness, asymmetry, nipple discharge, nipple retraction or skin changes  Abdomen:   soft, nontender, nondistended, no masses  Skin/Hair:  no unusual rashes or bruises  Extremities:  no edema, no cyanosis  Psychiatric:   oriented to time, place, person and situation. Appropriate mood and affect    Pelvic Exam:  External Genitalia:  normal appearance, hair distribution, and no lesions  Urethral Meatus:   normal in size, location, without lesions and prolapse  Bladder:    no fullness, masses or tenderness  Vagina:    normal appearance without lesions, no abnormal  discharge  Cervix:    normal without tenderness on motion  Uterus:    normal in size, contour, position, mobility, without tenderness  Adnexa:   normal without masses or tenderness  Perineum:   normal  Anus: no hemorroids     Results  PATHOLOGY  Pap: Normal with negative HPV (02/2023)    ASSESSMENT & PLAN:     Horace was seen today for gyn exam.    Diagnoses and all orders for this visit:    Encounter for gynecological examination with abnormal finding    No periods  -     HCG, Beta Subunit, Qual; Future    Birth control counseling    Other orders  -     Norethindrone (FERNANDA) 0.35 MG Oral Tab; Take 1 tablet (0.35 mg total) by mouth daily.      Assessment & Plan  Postpartum care  She is here for her annual gynecologic exam. She delivered her daughter in October 2024 and was last seen for her six-week postpartum visit in November 2024. Currently breastfeeding, she has not started the mini pill due to concerns about milk supply, although she has been filling the prescription. She is using condoms as contraception and has been sexually active. Her last menstrual period was in March. A blood pregnancy test is preferred over urine due to potential false negatives in urine tests within two weeks of conception.  - Order blood pregnancy test before starting the mini pill.  - Instruct to take the mini pill at the exact same time each day.  - Provide enough mini pills to last for a year.  - Advise to use condoms for the first month of mini pill use until a routine is established.  - Discuss the option of nexplanon contraception, effective for up to three years if does not do well w/ exact timing of minipill.  - Schedule next Pap smear for February 2026.  - Offer STD testing, which was declined.    Anxiety related to breastfeeding  She expressed anxiety about starting the mini pill due to concerns about its impact on milk supply. It was clarified that the mini pill contains no estrogen and should not affect milk supply.  -  Reassure that the mini pill does not impact milk supply.    Hypertension during pregnancy  She had hypertension during her pregnancy but is no longer on Procardia. Her blood pressure is currently well-controlled. If she switches to an estrogen-based contraceptive, a blood pressure check will be needed three to four months later due to previous hypertension during pregnancy.  - If switching to an estrogen-based contraceptive, perform a blood pressure check three to four months after starting.    Use of Lexapro  She is currently taking Lexapro and reports doing well on it.    SUMMARY:  Pap: Next cotest 2/26-28 per [facility]P guidelines.  BCM:  Condoms  STD screening: declines  Mammogram: n/a -- once 40 yrs old  HM updated  Depression screen:   Depression Screening (PHQ-2/PHQ-9): Over the LAST 2 WEEKS   Little interest or pleasure in doing things (over the last two weeks)?: Not at all    Feeling down, depressed, or hopeless (over the last two weeks)?: Not at all    PHQ-2 SCORE: 0          FOLLOW-UP     Return for med follow-up in 3-4 months. If on estrogen based ocps otherwise annual exams in one year    Note to patient and family:  The 21st Century Cures Act makes medical notes available to patients in the interest of transparency.  However, please be advised that this is a medical document.  It is intended as a peer to peer communication.  It is written in medical language and may contain abbreviations or verbiage that are technical and unfamiliar.  It may appear blunt or direct.  Medical documents are intended to carry relevant information, facts as evident, and the clinical opinion of the practitioner.

## 2025-07-01 ENCOUNTER — TELEPHONE (OUTPATIENT)
Dept: INTERNAL MEDICINE | Age: 40
End: 2025-07-01

## 2025-07-28 ENCOUNTER — APPOINTMENT (OUTPATIENT)
Dept: INTERNAL MEDICINE | Age: 40
End: 2025-07-28

## 2025-08-11 RX ORDER — FAMOTIDINE 20 MG/1
20 TABLET, FILM COATED ORAL 2 TIMES DAILY
Qty: 60 TABLET | Refills: 2 | OUTPATIENT
Start: 2025-08-11

## (undated) NOTE — LETTER
9/13/2019              Amy Otero        [address]        [facility] [address]         Dear Gulshan Nam,    It was a pleasure to see you.  Neg pap, HPV neg, repeat pap needed in 3 years, return to clinic 1 year for annual exam.  There is no need for f

## (undated) NOTE — MR AVS SNAPSHOT
ARISTIDES Niangua  [facility] [address] [address]  [phone]               Thank you for choosing us for your health care visit with Jacy Flor MD.  We are glad to serve you and happy to provide you with this summary of your visit.   Mary Take 2.5 mL by mouth every 6 (six) hours as needed for cough. Commonly known as:  PHENERGAN with CODEINE                Where to Get Your Medications      These medications were sent to [address] [place] [address],[address], [facility] track your progress   You don’t need to join a gym. Home exercises work great.  Put more priority on exercise in your life                    Visit [facility] online at  [email or web address]

## (undated) NOTE — LETTER
6/17/2024        Horace Otero        [address]        [facility] [address]         To Whom It May Concern,      Horace Otero is currently pregnant and under my care.  Her estimated due date is 11/14/2024.  This letter is to state Horace is approved for air travel.  Based on her estimated due date she will be 21 weeks 6 days on 7/10/2024, 23 weeks 1 day on 7/19/2024, 23 weeks 5 days on 7/23/2024 and 24 weeks 4 days on 7/29/2024.     Sincerely,    Cammie Chua MD  [address] [place] [id number]  Ph: [phone]  Fax: [phone]

## (undated) NOTE — LETTER
VACCINE ADMINISTRATION RECORD  PARENT / GUARDIAN APPROVAL  Date: 10/8/2024  Vaccine administered to: Horace Otero     : 1985    MRN: [id number]    A copy of the appropriate Centers for Disease Control and Prevention Vaccine Information statement has been provided. I have read or have had explained the information about the diseases and the vaccines listed below. There was an opportunity to ask questions and any questions were answered satisfactorily. I believe that I understand the benefits and risks of the vaccine cited and ask that the vaccine(s) listed below be given to me or to the person named above (for whom I am authorized to make this request).    VACCINES ADMINISTERED:  TDAP    I have read and hereby agree to be bound by the terms of this agreement as stated above. My signature is valid until revoked by me in writing.  This document is signed by SELF, relationship: SELF on 10/8/2024.:                                                                                                                                         Parent / Guardian Signature                                                Date    Senait KHAN CMA served as a witness to authentication that the identity of the person signing electronically is in fact the person represented as signing.

## (undated) NOTE — MR AVS SNAPSHOT
After Visit Summary   9/4/2019    Melany Pearl    MRN: [id number]           Visit Information     Date & Time  9/4/2019  3:20 PM Provider  Marcy Bonilla MD [address] [place] [address], [address] [place] [address],[address], [facility].  Phone  [phone] THINPREP PAP SMEAR ONLY [[id number] CUSTOM]  9/4/2019 9/4/2020                [facility] now offers Video Visits through [address] for adult and pediatric patients.   Video Visits are available Monday - Friday for many common conditions such as allergies, colds, cough Life-threatening emergencies needing immediate intervention   at a hospital emergency room.       Average cost  $2,300*   *Cost varies based on your insurance coverage  For more information about hours, locations or appointment options available at [facility],  vi

## (undated) NOTE — LETTER
[facility]  Administration of Anesthesia  I, Horace Otero agree to be cared for by a physician anesthesiologist alone and/or with a nurse anesthetist, who is specially trained to monitor me and give me medicine to put me to sleep or keep me comfortable during my procedure    I understand that my anesthesiologist and/or anesthetist is not an employee or agent of [facility] or [employer]. He or she works for [facility]    As the patient asking for anesthesia services, I agree to:  Allow the anesthesiologist (anesthesia doctor) to give me medicine and do additional procedures as necessary. Some examples are: Starting or using an “IV” to give me medicine, fluids or blood during my procedure, and having a breathing tube placed to help me breathe when I’m asleep (intubation). In the event that my heart stops working properly, I understand that my anesthesiologist will make every effort to sustain my life, unless otherwise directed by [facility] Do Not Resuscitate documents.  Tell my anesthesia doctor before my procedure:  If I am pregnant.  The last time that I ate or drank.  iii. All of the medicines I take (including prescriptions, herbal supplements, and pills I can buy without a prescription (including street drugs/illegal medications). Failure to inform my anesthesiologist about these medicines may increase my risk of anesthetic complications.  iv.If I am allergic to anything or have had a reaction to anesthesia before.  I understand how the anesthesia medicine will help me (benefits).  I understand that with any type of anesthesia medicine there are risks:  The most common risks are: nausea, vomiting, sore throat, muscle soreness, damage to my eyes, mouth, or teeth (from breathing tube placement).  Rare risks include: remembering what happened during my procedure, allergic reactions to medications, injury to my airway, heart, lungs, vision, nerves, or  muscles and in extremely rare instances death.  My doctor has explained to me other choices available to me for my care (alternatives).  Pregnant Patients (“epidural”):  I understand that the risks of having an epidural (medicine given into my back to help control pain during labor), include itching, low blood pressure, difficulty urinating, headache or slowing of the baby’s heart. Very rare risks include infection, bleeding, seizure, irregular heart rhythms and nerve injury.  Regional Anesthesia (“spinal”, “epidural”, & “nerve blocks”):  I understand that rare but potential complications include headache, bleeding, infection, seizure, irregular heart rhythms, and nerve injury.    _____________________________________________________________________________  Patient (or Representative) Signature/Relationship to Patient  Date   Time    _____________________________________________________________________________   Name (if used)    Language/Organization   Time    _____________________________________________________________________________  Nurse Anesthetist Signature     Date   Time  _____________________________________________________________________________  Anesthesiologist Signature     Date   Time  I have discussed the procedure and information above with the patient (or patient’s representative) and answered their questions. The patient or their representative has agreed to have anesthesia services.    _____________________________________________________________________________  Witness        Date   Time  I have verified that the signature is that of the patient or patient’s representative, and that it was signed before the procedure  Patient Name: Horace Otero     : 1985                 Printed: 10/9/2024 at 8:18 AM    Medical Record #: [id number]                                            Page 1 of 1  ----------ANESTHESIA CONSENT----------

## (undated) NOTE — Clinical Note
Hi - please review my note regarding our mutual patient Horace Otero. Let me know if you have any questions.